# Patient Record
Sex: MALE | Race: WHITE | NOT HISPANIC OR LATINO | Employment: FULL TIME | URBAN - METROPOLITAN AREA
[De-identification: names, ages, dates, MRNs, and addresses within clinical notes are randomized per-mention and may not be internally consistent; named-entity substitution may affect disease eponyms.]

---

## 2018-01-24 ENCOUNTER — OFFICE VISIT (OUTPATIENT)
Dept: SLEEP MEDICINE | Facility: CLINIC | Age: 23
End: 2018-01-24
Payer: MEDICAID

## 2018-01-24 VITALS
WEIGHT: 196.19 LBS | HEIGHT: 69 IN | HEART RATE: 83 BPM | DIASTOLIC BLOOD PRESSURE: 71 MMHG | SYSTOLIC BLOOD PRESSURE: 109 MMHG | BODY MASS INDEX: 29.06 KG/M2

## 2018-01-24 DIAGNOSIS — G47.30 SLEEP APNEA, UNSPECIFIED TYPE: Primary | ICD-10-CM

## 2018-01-24 DIAGNOSIS — F51.01 PRIMARY INSOMNIA: ICD-10-CM

## 2018-01-24 PROCEDURE — 99213 OFFICE O/P EST LOW 20 MIN: CPT | Mod: PBBFAC | Performed by: PSYCHIATRY & NEUROLOGY

## 2018-01-24 PROCEDURE — 99204 OFFICE O/P NEW MOD 45 MIN: CPT | Mod: S$PBB,,, | Performed by: PSYCHIATRY & NEUROLOGY

## 2018-01-24 PROCEDURE — 99999 PR PBB SHADOW E&M-EST. PATIENT-LVL III: CPT | Mod: PBBFAC,,, | Performed by: PSYCHIATRY & NEUROLOGY

## 2018-01-24 RX ORDER — PREDNISONE 20 MG/1
20 TABLET ORAL DAILY
COMMUNITY
End: 2018-05-16

## 2018-01-24 RX ORDER — TIOTROPIUM BROMIDE 18 UG/1
18 CAPSULE ORAL; RESPIRATORY (INHALATION) DAILY
COMMUNITY
End: 2018-03-14

## 2018-01-24 RX ORDER — OSELTAMIVIR PHOSPHATE 75 MG/1
75 CAPSULE ORAL
COMMUNITY
End: 2018-03-14

## 2018-01-24 RX ORDER — ALBUTEROL SULFATE 1.25 MG/3ML
2.5 SOLUTION RESPIRATORY (INHALATION) EVERY 6 HOURS PRN
COMMUNITY
End: 2019-02-04

## 2018-01-24 RX ORDER — AZITHROMYCIN 250 MG/1
500 TABLET, FILM COATED ORAL DAILY
COMMUNITY
End: 2018-05-16

## 2018-01-24 RX ORDER — FLUTICASONE FUROATE AND VILANTEROL 200; 25 UG/1; UG/1
1 POWDER RESPIRATORY (INHALATION) DAILY
COMMUNITY
End: 2021-05-28

## 2018-01-24 RX ORDER — MONTELUKAST SODIUM 10 MG/1
10 TABLET ORAL NIGHTLY
COMMUNITY
End: 2021-05-28

## 2018-01-24 RX ORDER — FLUTICASONE PROPIONATE 50 MCG
4 SPRAY, SUSPENSION (ML) NASAL DAILY
COMMUNITY
End: 2019-02-04 | Stop reason: SDUPTHER

## 2018-01-24 RX ORDER — ALBUTEROL SULFATE 90 UG/1
2 AEROSOL, METERED RESPIRATORY (INHALATION) EVERY 6 HOURS PRN
COMMUNITY
End: 2019-02-04 | Stop reason: SDUPTHER

## 2018-01-24 RX ORDER — METHYLPHENIDATE HYDROCHLORIDE 36 MG/1
36 TABLET ORAL EVERY MORNING
COMMUNITY
End: 2019-01-16 | Stop reason: SDUPTHER

## 2018-01-24 RX ORDER — BENZONATATE 200 MG/1
200 CAPSULE ORAL 3 TIMES DAILY PRN
COMMUNITY
End: 2018-05-16

## 2018-01-24 RX ORDER — NAPROXEN SODIUM 220 MG
220 TABLET ORAL
COMMUNITY

## 2018-01-24 RX ORDER — TRAZODONE HYDROCHLORIDE 300 MG/1
300 TABLET ORAL NIGHTLY
COMMUNITY
End: 2018-05-02

## 2018-01-24 RX ORDER — CLONIDINE HYDROCHLORIDE 0.1 MG/1
0.1 TABLET ORAL 2 TIMES DAILY
COMMUNITY
End: 2018-05-02

## 2018-01-24 NOTE — LETTER
January 24, 2018      Mac Cooper MD  [address] [place] [address]           [facility]  [address] [address]  Phone: [phone]          Patient: Nghia Chahal   MR Number: [id number]   YOB: 1995   Date of Visit: 1/24/2018       Dear Dr. Mac Cooper:    Thank you for referring Nghia Chahal to me for evaluation. Attached you will find relevant portions of my assessment and plan of care.    If you have questions, please do not hesitate to call me. I look forward to following Nghia Chahal along with you.    Sincerely,    Luis Michaels MD    Enclosure  CC:  No Recipients    If you would like to receive this communication electronically, please contact [email or web address] or [phone] to request more information on [employer] Link access.    For providers and/or their staff who would like to refer a patient to [facility], please contact us through our one-stop-shop provider referral line, [facility] , at [phone].    If you feel you have received this communication in error or would no longer like to receive these types of communications, please e-mail [email or web address]

## 2018-01-24 NOTE — PATIENT INSTRUCTIONS
Lalita or Vijay will contact you to schedule your sleep study. Their number is [phone] (ext 2). The [facility] is located on 7th floor of the [facility].    We will call you when the sleep study results are ready - if you have not heard from us by 2 weeks from the date of the study, please call [phone] (ext 1).    You are advised to abstain from driving should you feel sleepy or drowsy.

## 2018-01-24 NOTE — PROGRESS NOTES
"This 22 y.o. male patient presents for the evaluation of possible obstructive sleep apnea.    Patient not sleeping well since age 13.  Has tried numerous medications, but not working too well. Variable response.  Can feel hung over the next day    Takes 0.1 mg clonidine at bedtime and 300 mg trazodone at bedtime  Tried melatonin in the past.    He reports difficulty initiating sleep at night.  Not sleepy at bedtime, like morning "just struck".  Actually doesn't feel sleepy until around 6 am, but then still lies awake    He tried eliminating electronics from bedroom, darkening shades    In past, tried Concerta 36 mg for ADHD (tired, memory fog, daytime concentration difficulties) still taking    ESS = 1    Snoring when sleeping  Restless in sleep, toss and turn    Denies restless legs feeling      SLEEP ROUTINE:   Bed partner: own room   Time to bed: varies 10-11 pm   Sleep onset latency: prolonged   Disruptions or awakenings: none   Wakeup time: 6:30 am (work) if not having to work, stays in bed until about 10 am   Perceived sleep quality: poor   Daytime naps: none    History reviewed. No pertinent past medical history.    History reviewed. No pertinent surgical history.    Family History   Problem Relation Age of Onset    Retinal detachment Neg Hx     Macular degeneration Neg Hx     Glaucoma Neg Hx        Social History   Substance Use Topics    Smoking status: Never Smoker    Smokeless tobacco: Not on file    Alcohol use No       ALLERGIES: Reviewed in EPIC    CURRENT MEDICATIONS: Reviewed in EPIC    REVIEW OF SYSTEMS:  Sleep related symptoms as per HPI;    Denies weight gain;    Sometimes sinus problems;    Denies dyspnea;    Denies palpitations;    Denies acid reflux;    Denies polyuria;    sometimes body aches   Denies headaches;    sometimes mood disturbance;    Denies anemia;    Otherwise, a balance of systems reviewed is negative.    PHYSICAL EXAM:  /71   Pulse 83   Ht 5' 9" (1.753 m)   Wt 89 " kg (196 lb 3.4 oz)   BMI 28.98 kg/m²   GENERAL: Well groomed; Normally developed;  HEENT: Conjunctivae are non-erythematous; Pupils equal, round, and reactive to light;    Nose is symmetrical; Nasal mucosa is pink and moist; Septum is midline;    Turbinates are normal; Nasal airflow is normal;    Posterior pharynx is pink; Posterior palate is very low; Modified Mallampati: IV   Uvula is normal; Tongue is normal; Tonsils +1;    Dentition is fair; No TMJ tenderness;    Jaw opening and protrusion without click and without discomfort.  NECK: Supple. No thyromegaly. No palpable nodes. Neck circumference in inches is 14.5  SKIN: On face and neck: No abrasions, no rashes, no lesions.     No subcutaneous nodules are palpable.  RESPIRATORY: Chest is clear to auscultation.     Normal chest expansion and non-labored breathing at rest.  CARDIOVASCULAR: Normal S1, S2.  No murmurs, gallops or rubs.   No carotid bruits bilaterally.  EXTREMITIES: No clubbing. No cyanosis. Edema is absent.   NEURO: Oriented to time, place and person.    Normal attention span and concentration.  Station normal. Gait normal.  PSYCH: Affect is full. Mood is normal.    ASSESSMENT:    1. Sleep Apnea, unspecified. The patient symptomatically has snoring, poor sleep quality, and excessive daytime sleepiness (masked by Concerta) with exam findings of a crowded oral airway. This warrants further investigation for possible obstructive sleep apnea.    2. Insomnia NEC. He has tried multiple medications, including melatonin. Difficulty initiating and sustaining sleep, which could be related to underlying sleep apnea.         He states his goal is to sleep without medications.    PLAN:    Diagnostic: Overnight polysomnogram, in-lab. The nature of this procedure and its indication was discussed with the patient.    Precautions: The patient was advised to abstain from driving should they feel sleepy or drowsy.    Follow up: I will see the patient back after the  sleep study has been completed. At this time, we can review the data and discuss potential treatment options. MD/NP

## 2018-02-09 ENCOUNTER — TELEPHONE (OUTPATIENT)
Dept: SLEEP MEDICINE | Facility: CLINIC | Age: 23
End: 2018-02-09

## 2018-02-09 DIAGNOSIS — G47.30 SLEEP APNEA, UNSPECIFIED TYPE: Primary | ICD-10-CM

## 2018-02-09 NOTE — TELEPHONE ENCOUNTER
"Returned call and informed patient mother "for questions regarding this insurance denial, send an In Basket message to the Pre Service Intake pool or call the [facility] Pre-Service department at [phone] and reference referral ID [id number].The Pre-Service department hours are M-F 8 a.m. to 5 p.m"  (Autho department for more details to why patient was denied)    Also informed patient that provider ordered HST once approve by insurance pt will be contacted to schedule.    "

## 2018-02-09 NOTE — TELEPHONE ENCOUNTER
----- Message from Mmee Crowder sent at 2/8/2018 11:52 AM CST -----  Contact: KVNG SPRINGER [[id number]]  x_  1st Request  _  2nd Request  _  3rd Request      Who: KVNG SPRINGER [[id number]]    Why: Patient would like to know why he was denied sleep study. Patient would like for nurse to discuss it with mother.  Please call     What Number to Call Back: [phone] Jason Springer (Mother)    When to Expect a call back: (Within 24 hours)    Please return the call at earliest convenience. Thanks!

## 2018-02-09 NOTE — TELEPHONE ENCOUNTER
Please inform patient:    In-lab sleep study was denied by his insurance.    Will proceed with home sleep study, which will be scheduled by Vijay in the sleep lab.    Sincerely,  Dr. Michaels

## 2018-02-15 ENCOUNTER — TELEPHONE (OUTPATIENT)
Dept: SLEEP MEDICINE | Facility: OTHER | Age: 23
End: 2018-02-15

## 2018-02-20 ENCOUNTER — TELEPHONE (OUTPATIENT)
Dept: SLEEP MEDICINE | Facility: OTHER | Age: 23
End: 2018-02-20

## 2018-02-21 ENCOUNTER — HOSPITAL ENCOUNTER (OUTPATIENT)
Dept: SLEEP MEDICINE | Facility: OTHER | Age: 23
Discharge: HOME | End: 2018-02-21
Attending: PSYCHIATRY & NEUROLOGY
Payer: MEDICAID

## 2018-02-21 DIAGNOSIS — G47.33 OSA (OBSTRUCTIVE SLEEP APNEA): ICD-10-CM

## 2018-02-21 DIAGNOSIS — G47.30 SLEEP APNEA, UNSPECIFIED TYPE: ICD-10-CM

## 2018-02-21 PROCEDURE — 95800 SLP STDY UNATTENDED: CPT

## 2018-02-21 PROCEDURE — 95800 SLP STDY UNATTENDED: CPT | Mod: 26,,, | Performed by: PSYCHIATRY & NEUROLOGY

## 2018-02-28 ENCOUNTER — TELEPHONE (OUTPATIENT)
Dept: SLEEP MEDICINE | Facility: OTHER | Age: 23
End: 2018-02-28

## 2018-03-14 ENCOUNTER — OFFICE VISIT (OUTPATIENT)
Dept: SLEEP MEDICINE | Facility: CLINIC | Age: 23
End: 2018-03-14
Payer: MEDICAID

## 2018-03-14 VITALS
SYSTOLIC BLOOD PRESSURE: 119 MMHG | HEIGHT: 69 IN | WEIGHT: 183 LBS | DIASTOLIC BLOOD PRESSURE: 76 MMHG | HEART RATE: 84 BPM | BODY MASS INDEX: 27.11 KG/M2

## 2018-03-14 DIAGNOSIS — G47.33 OSA (OBSTRUCTIVE SLEEP APNEA): Primary | ICD-10-CM

## 2018-03-14 PROCEDURE — 99999 PR PBB SHADOW E&M-EST. PATIENT-LVL III: CPT | Mod: PBBFAC,,, | Performed by: NURSE PRACTITIONER

## 2018-03-14 PROCEDURE — 99213 OFFICE O/P EST LOW 20 MIN: CPT | Mod: PBBFAC | Performed by: NURSE PRACTITIONER

## 2018-03-14 PROCEDURE — 99214 OFFICE O/P EST MOD 30 MIN: CPT | Mod: S$PBB,,, | Performed by: NURSE PRACTITIONER

## 2018-03-14 RX ORDER — HYDROCODONE BITARTRATE AND ACETAMINOPHEN 5; 325 MG/1; MG/1
TABLET ORAL
COMMUNITY
Start: 2018-02-23 | End: 2018-05-16

## 2018-03-14 RX ORDER — IBUPROFEN 800 MG/1
TABLET ORAL
COMMUNITY
Start: 2018-02-23 | End: 2019-02-04

## 2018-03-14 NOTE — PATIENT INSTRUCTIONS
Your prescription for CPAP has been sent through our system. You should receive a call from [facility] in the next few days regarding your CPAP set up.     For any questions regarding your machine or supplies, please contact [facility] at [phone] (Ext [phone]).

## 2018-03-26 ENCOUNTER — TELEPHONE (OUTPATIENT)
Dept: SLEEP MEDICINE | Facility: CLINIC | Age: 23
End: 2018-03-26

## 2018-03-26 DIAGNOSIS — G47.33 OBSTRUCTIVE SLEEP APNEA: Primary | ICD-10-CM

## 2018-03-26 NOTE — TELEPHONE ENCOUNTER
----- Message from Radha Piña, RRT sent at 3/23/2018 11:16 AM CDT -----  Regarding: RX  Patient would like to get a rx for FFM and heated tubing.

## 2018-05-02 ENCOUNTER — OFFICE VISIT (OUTPATIENT)
Dept: SLEEP MEDICINE | Facility: CLINIC | Age: 23
End: 2018-05-02
Payer: MEDICAID

## 2018-05-02 VITALS
SYSTOLIC BLOOD PRESSURE: 110 MMHG | WEIGHT: 187.19 LBS | DIASTOLIC BLOOD PRESSURE: 70 MMHG | HEIGHT: 69 IN | HEART RATE: 89 BPM | BODY MASS INDEX: 27.73 KG/M2

## 2018-05-02 DIAGNOSIS — R09.81 CHRONIC NASAL CONGESTION: ICD-10-CM

## 2018-05-02 DIAGNOSIS — G47.10 PERSISTENT HYPERSOMNIA: ICD-10-CM

## 2018-05-02 DIAGNOSIS — G47.33 OSA (OBSTRUCTIVE SLEEP APNEA): Primary | ICD-10-CM

## 2018-05-02 PROCEDURE — 99999 PR PBB SHADOW E&M-EST. PATIENT-LVL V: CPT | Mod: PBBFAC,,, | Performed by: NURSE PRACTITIONER

## 2018-05-02 PROCEDURE — 99215 OFFICE O/P EST HI 40 MIN: CPT | Mod: PBBFAC | Performed by: NURSE PRACTITIONER

## 2018-05-02 PROCEDURE — 99214 OFFICE O/P EST MOD 30 MIN: CPT | Mod: S$PBB,,, | Performed by: NURSE PRACTITIONER

## 2018-05-02 RX ORDER — TRAZODONE HYDROCHLORIDE 100 MG/1
TABLET ORAL
COMMUNITY
Start: 2018-04-26 | End: 2018-12-05

## 2018-05-02 RX ORDER — TRAMADOL HYDROCHLORIDE 50 MG/1
TABLET ORAL
COMMUNITY
Start: 2018-03-21 | End: 2018-05-16

## 2018-05-02 RX ORDER — AZELASTINE HCL 205.5 UG/1
4 SPRAY NASAL 2 TIMES DAILY
COMMUNITY
Start: 2018-04-17 | End: 2019-02-04 | Stop reason: SDUPTHER

## 2018-05-02 RX ORDER — DICLOFENAC SODIUM 75 MG/1
TABLET, DELAYED RELEASE ORAL
COMMUNITY
Start: 2018-03-21 | End: 2021-05-28

## 2018-05-02 RX ORDER — ALBUTEROL SULFATE 0.83 MG/ML
SOLUTION RESPIRATORY (INHALATION)
COMMUNITY
Start: 2018-04-02 | End: 2019-02-04 | Stop reason: SDUPTHER

## 2018-05-02 RX ORDER — METHOCARBAMOL 500 MG/1
500 TABLET, FILM COATED ORAL
COMMUNITY
Start: 2018-04-25 | End: 2021-05-28

## 2018-05-02 NOTE — PROGRESS NOTES
"Nghia Chahal seen in follow-up for JAYLEN management and CPAP equipment check after set up.     INTERVAL HISTORY:    05/02/2018 HESHAM Javier NP: Pt returns after set up of PAP machine on 03/23/2018 at [facility]. Pt sleep complaints of snoring, and poor sleep quality now resolved with PAP use. Reports that nocturia of up to 6 x per night resolved, "I don't wake up anymore". Also has weaned off Trazodone 300 mg, now only taking 50 - 100 mg dosing. He has also stopped taking clonidine. Goal is to stop taking sleep meds altogether. However despite averaging nearly 8 hours of sleep with CPAP nightly and taking Concerta 36 mg daily, he continues to have excessive daytime sleepiness. Reports that he finds himself dozing off while driving and he struggles to stay awake while at work. He finds daytime sleepiness disabling.      Denies oral/nasal drying with Airfit F20 mask. Initially got size small mask which caused skin irritation; pt's mother had an extra mask size large which he uses that fits better. Denies mask leaks. Denies rainout.  Denies pressure intolerance or air hunger. Denies aerophagia. Pt's machine originally prescribed 4 - 20 cm but he had air hunger and self-adjusted machine settings to 12 - 20 cm.     Since starting CPAP, chronic nasal congestion has worsened despite taking Flonase, Astelin, and Singulair. Pt also using heated tubing with CPAP.     EPWORTH SLEEPINESS SCALE 3/13/2018   Sitting and reading 0   Watching TV 0   Sitting, inactive in a public place (e.g. a theatre or a meeting)s 1   As a passenger in a car for an hour without a break 3   Lying down to rest in the afternoon when circumstances permit 0   Sitting and talking to someone 0   Sitting quietly after a lunch without alcohol 0   In a car, while stopped for a few minutes in traffic 3   Total score 7       CPAP Interrogation: APAP 12-20 cm  Compliance Summary Days with Device Usage: 30 days Percentage of Days >=4 Hours: 100.0% Average Usage (Days " "Used): 7 hrs. 57 mins. 52 secs. Average Usage (All Days): 7 hrs. 57 mins. 52 secs  Apnea Indices Average AHI: 1.3 Average OA Index: 0.4 Average CA Index: 0.2   Large Leak Average Time in Large Leak: 3 mins. 10 secs. Average % of Night in Large Leak: 0.7%   Periodic Breathing Average % of Night in PB: 0.2%  90%tile pressure: 15.8 cm      03/14/2018 HESHAM Javier NP: Of note, in -lab PSG denied by insurance; HST ordered instead. Discussed 02/21/2018 home sleep study results in detail. Pt complaints of snoring, poor sleep quality, and excessive daytime sleepiness remain the same.     Continues to take Concentra 36 mg daily for ADHD and Trazodone 300 mg w/ Clonidine 0.1 mg for insomnia. Both managed by PCP.     EPWORTH SLEEPINESS SCALE 3/13/2018   Sitting and reading 0   Watching TV 0   Sitting, inactive in a public place (e.g. a theatre or a meeting) 1   As a passenger in a car for an hour without a break 3   Lying down to rest in the afternoon when circumstances permit 0   Sitting and talking to someone 0   Sitting quietly after a lunch without alcohol 0   In a car, while stopped for a few minutes in traffic 3   Total score 7          01/24/2018 Dr. Michaels This 22 y.o. male patient presents for the evaluation of possible obstructive sleep apnea.    Patient not sleeping well since age 13.  Has tried numerous medications, but not working too well. Variable response.  Can feel hung over the next day    Takes 0.1 mg clonidine at bedtime and 300 mg trazodone at bedtime  Tried melatonin in the past.    He reports difficulty initiating sleep at night.  Not sleepy at bedtime, like morning "just struck".  Actually doesn't feel sleepy until around 6 am, but then still lies awake    He tried eliminating electronics from bedroom, darkening shades    In past, tried Concerta 36 mg for ADHD (tired, memory fog, daytime concentration difficulties) still taking    ESS = 1    Snoring when sleeping  Restless in sleep, toss and turn    Denies " "restless legs feeling      SLEEP ROUTINE:   Bed partner: own room   Time to bed: varies 10-11 pm   Sleep onset latency: prolonged   Disruptions or awakenings: none   Wakeup time: 6:30 am (work) if not having to work, stays in bed until about 10 am   Perceived sleep quality: poor   Daytime naps: none    Baseline Sleep Study: 02/21/2018  lb. The overall AHI was 14 and overall RDI was 41. The oxygen yas was 87.5 % and % time < 90% SpO2 was 0.1 %.      No past medical history on file.    No past surgical history on file.    Family History   Problem Relation Age of Onset    Sleep apnea Mother     Retinal detachment Neg Hx     Macular degeneration Neg Hx     Glaucoma Neg Hx        Social History   Substance Use Topics    Smoking status: Never Smoker    Smokeless tobacco: Not on file    Alcohol use No       ALLERGIES: Reviewed in EPIC    CURRENT MEDICATIONS: Reviewed in EPIC    REVIEW OF SYSTEMS:  Sleep related symptoms as per HPI;    Denies weight gain;    Sometimes sinus problems;    Denies dyspnea;    Denies palpitations;    Denies acid reflux;    Denies polyuria;    sometimes body aches   Denies headaches;    sometimes mood disturbance;    Denies anemia;    Otherwise, a balance of systems reviewed is negative.    PHYSICAL EXAM:  /70   Pulse 89   Ht 5' 9" (1.753 m)   Wt 84.9 kg (187 lb 2.7 oz)   BMI 27.64 kg/m²   GENERAL: Well groomed; Normally developed;  HEENT: Conjunctivae are non-erythematous; Pupils equal, round, and reactive to light;    Nose is symmetrical; Nasal mucosa is pink and moist; Septum is midline;    Turbinates are normal; Nasal airflow is normal;    Posterior pharynx is pink; Posterior palate is very low; Modified Mallampati: IV   Uvula is normal; Tongue is normal; Tonsils +1;    Dentition is fair; No TMJ tenderness;    Jaw opening and protrusion without click and without discomfort.  NECK: Supple. No thyromegaly. No palpable nodes. Neck circumference in inches is 14.5  SKIN: " On face and neck: No abrasions, no rashes, no lesions.     No subcutaneous nodules are palpable.  RESPIRATORY: Chest is clear to auscultation.     Normal chest expansion and non-labored breathing at rest.  CARDIOVASCULAR: Normal S1, S2.  No murmurs, gallops or rubs.   No carotid bruits bilaterally.  EXTREMITIES: No clubbing. No cyanosis. Edema is absent.   NEURO: Oriented to time, place and person.    Normal attention span and concentration.  Station normal. Gait normal.  PSYCH: Affect is full. Mood is normal.    ASSESSMENT:    Obstructive sleep apnea, mild by AHI, severe by RDI. The patient symptomatically has snoring, and poor sleep quality resolved with CPAP use. The patient is adherent on CPAP and experiencing symptomatic benefit. Medical co-morbidities: overweight BMI.     Persistent hypersomnia, despite JAYLEN being effectively treated and daily Concerta 36 mg. Pt not interested in additional medications ie stimulants unless it is warranted and condition is test-proven     Insomnia NEC. Improving since JAYLEN treatment, continues to wean Trazodone, now down to 50 - 100 mg qhs.       He states his goal is to sleep without medications.    Chronic nasal congestion, exacerbated by PAP therapy despite Flonase, Astelin, heated tubing, and Singulair    PLAN:    Treatment:  -continue auto CPAP 12 - 20 cm  -PSG (w/ APAP) followed by MSLT to rule out narcolepsy; consider stopping stimulants one week before test  -Referral to ENT for nasal congestion eval/management   -RTC after MSLT, consider f/u MD if + narcolepsy for management    Education: During our discussion today, we talked about the etiology of obstructive sleep apnea as well as the potential ramifications of untreated sleep apnea, which could include daytime sleepiness, hypertension, heart disease and/or stroke. We discussed potential treatment options, which could include weight loss, body positioning, continuous positive airway pressure (CPAP), OA, EPAP, or  referral for surgical consideration.    Precautions: The patient was advised to abstain from driving should they feel sleepy  or drowsy.

## 2018-05-16 ENCOUNTER — OFFICE VISIT (OUTPATIENT)
Dept: OTOLARYNGOLOGY | Facility: CLINIC | Age: 23
End: 2018-05-16
Payer: MEDICAID

## 2018-05-16 VITALS — BODY MASS INDEX: 27.88 KG/M2 | HEIGHT: 69 IN | WEIGHT: 188.25 LBS

## 2018-05-16 DIAGNOSIS — R09.81 NASAL CONGESTION: ICD-10-CM

## 2018-05-16 DIAGNOSIS — J32.4 CHRONIC PANSINUSITIS: ICD-10-CM

## 2018-05-16 DIAGNOSIS — G47.33 OSA (OBSTRUCTIVE SLEEP APNEA): Primary | ICD-10-CM

## 2018-05-16 DIAGNOSIS — J34.3 NASAL TURBINATE HYPERTROPHY: ICD-10-CM

## 2018-05-16 DIAGNOSIS — J34.2 NASAL SEPTAL DEVIATION: ICD-10-CM

## 2018-05-16 PROCEDURE — 99999 PR PBB SHADOW E&M-EST. PATIENT-LVL III: CPT | Mod: PBBFAC,,, | Performed by: OTOLARYNGOLOGY

## 2018-05-16 PROCEDURE — 99213 OFFICE O/P EST LOW 20 MIN: CPT | Mod: PBBFAC,PO,25 | Performed by: OTOLARYNGOLOGY

## 2018-05-16 PROCEDURE — 31231 NASAL ENDOSCOPY DX: CPT | Mod: S$PBB,,, | Performed by: OTOLARYNGOLOGY

## 2018-05-16 PROCEDURE — 31231 NASAL ENDOSCOPY DX: CPT | Mod: PBBFAC,PO | Performed by: OTOLARYNGOLOGY

## 2018-05-16 PROCEDURE — 99204 OFFICE O/P NEW MOD 45 MIN: CPT | Mod: 25,S$PBB,, | Performed by: OTOLARYNGOLOGY

## 2018-05-16 NOTE — LETTER
May 16, 2018      Mikhail Javier, NP  [address]  [address]  [facility] [address]           [facility]  [address] [facility] [address] [place] [address]  Phone: [phone]  Fax: [phone]          Patient: Nghia Chahal   MR Number: [id number]   YOB: 1995   Date of Visit: 5/16/2018       Dear Mikhail Javier:    Thank you for referring Nghia Chahal to me for evaluation. Attached you will find relevant portions of my assessment and plan of care.    If you have questions, please do not hesitate to call me. I look forward to following Nghia Chahal along with you.    Sincerely,    Jhon Hayes MD    Enclosure  CC:  No Recipients    If you would like to receive this communication electronically, please contact [email or web address] or [phone] to request more information on [employer] Link access.    For providers and/or their staff who would like to refer a patient to [facility], please contact us through our one-stop-shop provider referral line, [facility], at [phone].    If you feel you have received this communication in error or would no longer like to receive these types of communications, please e-mail [email or web address]

## 2018-05-16 NOTE — PROGRESS NOTES
Subjective:       Patient ID: Nghia Chahal is a 22 y.o. male.    Chief Complaint: Sinus Problem and Nasal Congestion    Nghia is here for nasal congestion. Symptoms have been present for years, but worse recently. He has JAYLEN and is compliant with therapy, he has used Flonase Astelin and saline without improvement. Uses singulair but not an oral AH. He is using humidification. Congestion is worse at night. Rhinorrhea is worse during the daytime. He reports recurrent sinus infections, reports at least 6 infections. Last 2 weeks with post-infectious cough for 1.5 months.    Also has concern for other sleep disorders being worked up.  Has asthma and AR - dust mites on RAST testing done 1 yr ago at [facility]. Never had IT. He reports changing his bedcovers and doing home modifications.      AHI prior to treatment was 14, with yas of 87%.   BMI 27  Has tailbone pain and fibromylagia as well as depression    SNOT-22 = 57  No previous head or neck surgery    History   Smoking Status    Never Smoker   Smokeless Tobacco    Not on file     History   Alcohol Use No          Review of Systems   Constitutional: Negative for activity change and appetite change.   Eyes: Negative for discharge.   Respiratory: Negative for difficulty breathing and wheezing   Cardiovascular: Negative for chest pain.   Gastrointestinal: Negative for abdominal distention and abdominal pain.   Endocrine: Negative for cold intolerance and heat intolerance.   Genitourinary: Negative for dysuria.   Musculoskeletal: Negative for gait problem and joint swelling.   Skin: Negative for color change and pallor.   Neurological: Negative for syncope and weakness.   Psychiatric/Behavioral: Negative for agitation and confusion.     Objective:        Constitutional:   He is oriented to person, place, and time. He appears well-developed and well-nourished. He appears alert. He is active. Normal speech.      Head:  Normocephalic and atraumatic. Head is without  TMJ tenderness. No scars. Salivary glands normal.  Facial strength is normal.      Ears:    Right Ear: No drainage or swelling. No middle ear effusion.   Left Ear: No drainage or swelling.  No middle ear effusion.     Nose:  Septal deviation (moderate left) present. No mucosal edema, rhinorrhea or sinus tenderness. Turbinate hypertrophy (moderate).      Mouth/Throat  Oropharynx clear and moist without lesions or asymmetry, normal uvula midline and mirror exam normal. Normal dentition. No uvula swelling, lacerations or trismus. No oropharyngeal exudate. Tonsillar erythema, tonsillar exudate.    Normal pharynx, normal palate length. Normal tongue size. No significant retrognathia. Tonsils small      Neck:  Full range of motion with neck supple and no adenopathy. Thyroid tenderness is present. No tracheal deviation, no edema, no erythema, normal range of motion, no stridor, no crepitus and no neck rigidity present. No thyroid mass present.     Cardiovascular:   Intact distal pulses and normal pulses.      Pulmonary/Chest:   Effort normal and breath sounds normal. No stridor.     Psychiatric:   His speech is normal and behavior is normal. His mood appears not anxious. His affect is not labile.     Neurological:   He is alert and oriented to person, place, and time. No sensory deficit.     Skin:   No abrasions, lacerations, lesions, or rashes. No abrasion and no bruising noted.         Tests / Results:  Reviewed Sleep notes    Name: Nghia Chahal     Pre-procedure diagnosis: JAYLEN (obstructive sleep apnea) [G47.33]  Post-procedure diagnosis: Same    Procedure: Bilateral nasal endoscopy  Anesthesia:  2% Lidocaine and 4% Oxymetazoline topical    Procedure: Risks, benefits, and alternatives of the procedure were discussed with the patient, and consent was obtained to perform a nasal endoscopy.  The nasal cavity was sprayed with a topical decongestant and topical anesthetic. After adequate anesthesia was obtained, the scope  was passed into each nostril independently.  The nasal cavities, nasopharynx, choana, eustachian tube, fossa of Rosenmüller, and adenoids were examined with the findings described below. At the end of the examination, the scope was removed. The patient tolerated the procedure well with no complications.     Findings:  -     Nasal septum: LEFT moderate deviation along entire length  -     Inferior turbinate: hypertrophy or edema (Moderate) bilaterally  -     Middle meatus: LEFT: no purulence, no obstruction, no polyps  RIGHT: no purulence, no obstruction, no polyps  -     Sphenoethmoidal recess LEFT: no purulence, no obstruction, no polyps RIGHT no purulence, no obstruction, no polyps  -     Adenoids have no hypertrophy  -     There is no stenosis of the choana,  eustachian tube, or nasopharynx  -     Fossa of Rosenmüller is symmetric and contains no mass  -     No other findings      Assessment:       1. JAYLEN (obstructive sleep apnea)    2. Nasal congestion    3. Chronic pansinusitis    4. Nasal turbinate hypertrophy    5. Nasal septal deviation          Plan:       CT Sinus  Continue nasal medications that he is currently doing  May need to consider Allergy testing  He is wanting to look at least invasive methods for now  FU with imaging to discuss options.

## 2018-06-06 ENCOUNTER — HOSPITAL ENCOUNTER (OUTPATIENT)
Dept: RADIOLOGY | Facility: HOSPITAL | Age: 23
Discharge: HOME | End: 2018-06-06
Attending: OTOLARYNGOLOGY
Payer: MEDICAID

## 2018-06-06 ENCOUNTER — OFFICE VISIT (OUTPATIENT)
Dept: OTOLARYNGOLOGY | Facility: CLINIC | Age: 23
End: 2018-06-06
Payer: MEDICAID

## 2018-06-06 VITALS — HEIGHT: 69 IN | BODY MASS INDEX: 28.05 KG/M2 | WEIGHT: 189.38 LBS

## 2018-06-06 DIAGNOSIS — J32.4 CHRONIC PANSINUSITIS: ICD-10-CM

## 2018-06-06 DIAGNOSIS — G47.33 OSA (OBSTRUCTIVE SLEEP APNEA): ICD-10-CM

## 2018-06-06 DIAGNOSIS — R09.81 NASAL CONGESTION: ICD-10-CM

## 2018-06-06 DIAGNOSIS — R09.81 NASAL CONGESTION: Primary | ICD-10-CM

## 2018-06-06 DIAGNOSIS — J30.89 NON-SEASONAL ALLERGIC RHINITIS, UNSPECIFIED TRIGGER: ICD-10-CM

## 2018-06-06 DIAGNOSIS — J34.3 NASAL TURBINATE HYPERTROPHY: ICD-10-CM

## 2018-06-06 DIAGNOSIS — J34.2 NASAL SEPTAL DEVIATION: ICD-10-CM

## 2018-06-06 PROCEDURE — 99999 PR PBB SHADOW E&M-EST. PATIENT-LVL III: CPT | Mod: PBBFAC,,, | Performed by: OTOLARYNGOLOGY

## 2018-06-06 PROCEDURE — 99213 OFFICE O/P EST LOW 20 MIN: CPT | Mod: S$PBB,,, | Performed by: OTOLARYNGOLOGY

## 2018-06-06 PROCEDURE — 70486 CT MAXILLOFACIAL W/O DYE: CPT | Mod: TC,PO

## 2018-06-06 PROCEDURE — 99213 OFFICE O/P EST LOW 20 MIN: CPT | Mod: PBBFAC,25,PO | Performed by: OTOLARYNGOLOGY

## 2018-06-06 PROCEDURE — 70486 CT MAXILLOFACIAL W/O DYE: CPT | Mod: 26,,, | Performed by: RADIOLOGY

## 2018-06-06 NOTE — PROGRESS NOTES
Subjective:       Patient ID: Nghia Chahal is a 22 y.o. male.    Chief Complaint: review ct    Nghia is here for follow-up of nasal obstruction. Here for review. No changes. Still with nasal issues. Feels medicines have not helped.     Review of Systems   Constitutional: Negative for activity change and appetite change.   Respiratory: Negative for difficulty breathing and wheezing   Cardiovascular: Negative for chest pain.      Objective:        Constitutional:   Vital signs are normal. He appears well-developed and well-nourished.     Head:  Normocephalic and atraumatic.     Nose:  Septal deviation present. Turbinate hypertrophy (mod).          Tests / Results:  Cranks Coolidge CT Score:  I personally reviewed the CT Sinus and my findings indicate:  Normal sinus scan    Sinus system Right Left   Maxillary 0 0   Anterior ethmoid 0 0   Posterior ethmoid 0 0   Sphenoid 0 0   Frontal 0 0   OMC 0 0   Total score 0 0   0 = no opacification; 1= partial opacification; 2 = complete opacification    Staging for Rhinosinusitis. Otol Head Neck Surg 1997; 117:S35-S40      Assessment:       1. Nasal congestion    2. Nasal turbinate hypertrophy    3. Nasal septal deviation    4. Non-seasonal allergic rhinitis, unspecified trigger          Plan:         Ct Sinus negative for paranasal sinus disease. Not sure if allergy playing a role, but discussed non-surgical treatment for now as he is not interested in nasal airway (turbinates) surgery. Allergy referral to see if any triggers. FU with me prn.

## 2018-09-24 ENCOUNTER — HOSPITAL ENCOUNTER (OUTPATIENT)
Dept: SLEEP MEDICINE | Facility: OTHER | Age: 23
Discharge: HOME | End: 2018-09-24
Attending: FAMILY MEDICINE
Payer: MEDICAID

## 2018-09-24 DIAGNOSIS — G47.10 PERSISTENT HYPERSOMNIA: ICD-10-CM

## 2018-09-24 DIAGNOSIS — G47.19 EXCESSIVE DAYTIME SLEEPINESS: Primary | ICD-10-CM

## 2018-09-24 DIAGNOSIS — G47.33 OSA (OBSTRUCTIVE SLEEP APNEA): ICD-10-CM

## 2018-09-24 PROCEDURE — 95810 POLYSOM 6/> YRS 4/> PARAM: CPT | Mod: 26,,, | Performed by: INTERNAL MEDICINE

## 2018-09-24 PROCEDURE — 95805 MULTIPLE SLEEP LATENCY TEST: CPT

## 2018-09-24 PROCEDURE — 95805 MULTIPLE SLEEP LATENCY TEST: CPT | Mod: 26,,, | Performed by: INTERNAL MEDICINE

## 2018-09-24 PROCEDURE — 95810 POLYSOM 6/> YRS 4/> PARAM: CPT

## 2018-09-25 LAB
AMPHET+METHAMPHET UR QL: NORMAL
BARBITURATES UR QL SCN>200 NG/ML: NEGATIVE
BENZODIAZ UR QL SCN>200 NG/ML: NEGATIVE
BZE UR QL SCN: NEGATIVE
CANNABINOIDS UR QL SCN: NEGATIVE
CREAT UR-MCNC: 132.8 MG/DL
ETHANOL UR-MCNC: <10 MG/DL
METHADONE UR QL SCN>300 NG/ML: NEGATIVE
OPIATES UR QL SCN: NEGATIVE
PCP UR QL SCN>25 NG/ML: NEGATIVE
TOXICOLOGY INFORMATION: NORMAL

## 2018-09-25 PROCEDURE — 80307 DRUG TEST PRSMV CHEM ANLYZR: CPT

## 2018-09-25 NOTE — PROGRESS NOTES
Patient was educated about the purpose of the wires and what data was being collected. Patient was informed that the technician may need to enter the room during the shift to fix leads or make adjustments to the equipment. Patient started the study on cpap. Cpap pressure started at 14 cmH20 for patient comfort.    EKG appears to be NSR    Medium Simplus full face mask in use. Humdification3/Cflex3    Chin leads and electrodes changed for poor signal. EKG electrodes changed for poor signal    Optimal pressure 14 cmH20   Supine rem observed at optimal    Patient stated that he slept well with cpap    Follow up instructions were provided to the patient

## 2018-10-02 ENCOUNTER — TELEPHONE (OUTPATIENT)
Dept: SLEEP MEDICINE | Facility: CLINIC | Age: 23
End: 2018-10-02

## 2018-10-03 ENCOUNTER — TELEPHONE (OUTPATIENT)
Dept: SLEEP MEDICINE | Facility: CLINIC | Age: 23
End: 2018-10-03

## 2018-10-03 NOTE — TELEPHONE ENCOUNTER
----- Message from Jazlyn Perales sent at 10/3/2018  8:24 AM CDT -----  Contact: pt            Name of Who is Calling: KVNG SPRINGER [[id number]]      What is the request in detail: pt calling to see if he can be seen sooner due to insurance changing.. Please advise    Can the clinic reply by [facility]: no    What Number to Call Back [facility]: [phone]

## 2018-10-03 NOTE — TELEPHONE ENCOUNTER
Ramona,  I scheduled this gentleman yesterday for 11/7/18. He has medicaid. Now he wants to come before November 7 because of insurance changes. Do you want me to reschedule him?

## 2018-10-15 ENCOUNTER — TELEPHONE (OUTPATIENT)
Dept: SLEEP MEDICINE | Facility: CLINIC | Age: 23
End: 2018-10-15

## 2018-10-15 NOTE — TELEPHONE ENCOUNTER
For reasons unknown to me this message was addressed to me, but not routed. I simply noticed when completing chart review for another reason:  Deuce Caban MA          10/3/18 8:29 AM   Solange Greene,  I scheduled this gentleman yesterday for 11/7/18. He has medicaid. Now he wants to come before November 7 because of insurance changes. Do you want me to reschedule him?              If pt would like to be seen before currently scheduled appointment, if there is a spot available that accommodates his insurance and availability, even if with different provider, pt may reschedule.

## 2018-10-26 ENCOUNTER — OFFICE VISIT (OUTPATIENT)
Dept: URGENT CARE | Facility: CLINIC | Age: 23
End: 2018-10-26
Payer: MEDICAID

## 2018-10-26 VITALS
DIASTOLIC BLOOD PRESSURE: 77 MMHG | HEIGHT: 69 IN | OXYGEN SATURATION: 98 % | TEMPERATURE: 97 F | BODY MASS INDEX: 32.29 KG/M2 | WEIGHT: 218 LBS | HEART RATE: 90 BPM | SYSTOLIC BLOOD PRESSURE: 125 MMHG

## 2018-10-26 DIAGNOSIS — J40 BRONCHITIS: ICD-10-CM

## 2018-10-26 DIAGNOSIS — R52 GENERALIZED BODY ACHES: Primary | ICD-10-CM

## 2018-10-26 DIAGNOSIS — R05.9 COUGH: ICD-10-CM

## 2018-10-26 PROCEDURE — 87804 INFLUENZA ASSAY W/OPTIC: CPT | Mod: QW,S$GLB,, | Performed by: NURSE PRACTITIONER

## 2018-10-26 PROCEDURE — 99203 OFFICE O/P NEW LOW 30 MIN: CPT | Mod: S$GLB,,, | Performed by: EMERGENCY MEDICINE

## 2018-10-26 RX ORDER — BENZONATATE 100 MG/1
100 CAPSULE ORAL 3 TIMES DAILY PRN
Qty: 30 CAPSULE | Refills: 1 | Status: SHIPPED | OUTPATIENT
Start: 2018-10-26 | End: 2019-10-26

## 2018-10-26 RX ORDER — PREDNISONE 20 MG/1
40 TABLET ORAL DAILY
Qty: 10 TABLET | Refills: 0 | Status: SHIPPED | OUTPATIENT
Start: 2018-10-26 | End: 2018-10-31

## 2018-10-26 RX ORDER — AZITHROMYCIN 250 MG/1
TABLET, FILM COATED ORAL
Qty: 6 TABLET | Refills: 0 | Status: SHIPPED | OUTPATIENT
Start: 2018-10-26 | End: 2019-01-16

## 2018-10-26 NOTE — PROGRESS NOTES
"Subjective:       Patient ID: Nghia Chahal is a 23 y.o. male.    Vitals:  height is 5' 9" (1.753 m) and weight is 98.9 kg (218 lb). His oral temperature is 97.4 °F (36.3 °C). His blood pressure is 125/77 and his pulse is 90. His oxygen saturation is 98%.     Chief Complaint: Cough    Pt presents with nonproductive cough, body aches, chills and nasal congestion x 3 days. He denies fevers but reports chills. He is requesting flu swab.       Cough   This is a new problem. The current episode started in the past 7 days. Associated symptoms include chills and a sore throat. Pertinent negatives include no chest pain, ear pain, eye redness, fever, headaches, myalgias, shortness of breath or wheezing. Associated symptoms comments: Sneezing, running nose. Treatments tried: prednisone, tessalom perles. The treatment provided no relief.     Review of Systems   Constitution: Positive for chills and malaise/fatigue. Negative for fever.   HENT: Positive for sore throat. Negative for congestion, ear pain and hoarse voice.    Eyes: Negative for discharge and redness.   Cardiovascular: Negative for chest pain, dyspnea on exertion and leg swelling.   Respiratory: Positive for cough. Negative for shortness of breath, sputum production and wheezing.    Musculoskeletal: Negative for myalgias.   Gastrointestinal: Negative for abdominal pain and nausea.   Neurological: Negative for headaches.   All other systems reviewed and are negative.      Objective:      Physical Exam   Constitutional: He is oriented to person, place, and time. He appears well-developed and well-nourished. He is cooperative.  Non-toxic appearance. He does not appear ill. No distress.   HENT:   Head: Normocephalic and atraumatic.   Right Ear: Hearing, tympanic membrane, external ear and ear canal normal.   Left Ear: Hearing, tympanic membrane, external ear and ear canal normal.   Nose: Nose normal. No mucosal edema, rhinorrhea or nasal deformity. No epistaxis. " Right sinus exhibits no maxillary sinus tenderness and no frontal sinus tenderness. Left sinus exhibits no maxillary sinus tenderness and no frontal sinus tenderness.   Mouth/Throat: Uvula is midline, oropharynx is clear and moist and mucous membranes are normal. No trismus in the jaw. Normal dentition. No uvula swelling. No posterior oropharyngeal erythema.   Eyes: Conjunctivae and lids are normal. No scleral icterus.   Sclera clear bilat   Neck: Trachea normal, full passive range of motion without pain and phonation normal. Neck supple.   Cardiovascular: Normal rate, regular rhythm, normal heart sounds, intact distal pulses and normal pulses.   Pulmonary/Chest: Effort normal and breath sounds normal. No respiratory distress.   Rhonchi to bases, no wheezing   Abdominal: Soft. Normal appearance and bowel sounds are normal. He exhibits no distension. There is no tenderness.   Musculoskeletal: Normal range of motion. He exhibits no edema or deformity.   Neurological: He is alert and oriented to person, place, and time. He exhibits normal muscle tone. Coordination normal.   Skin: Skin is warm, dry and intact. He is not diaphoretic. No pallor.   Psychiatric: He has a normal mood and affect. His speech is normal and behavior is normal. Judgment and thought content normal. Cognition and memory are normal.   Nursing note and vitals reviewed.      Assessment:       1. Generalized body aches    2. Bronchitis    3. Cough        Plan:         Generalized body aches  -     POCT Influenza A/B    Bronchitis    Cough    Other orders  -     azithromycin (Z-GUILHERME) 250 MG tablet; Take 2 tablets by mouth on day 1; Take 1 tablet by mouth on days 2-5  Dispense: 6 tablet; Refill: 0  -     benzonatate (TESSALON PERLES) 100 MG capsule; Take 1 capsule (100 mg total) by mouth 3 (three) times daily as needed for Cough.  Dispense: 30 capsule; Refill: 1  -     predniSONE (DELTASONE) 20 MG tablet; Take 2 tablets (40 mg total) by mouth once daily.  for 5 days  Dispense: 10 tablet; Refill: 0       flu swab negative

## 2018-10-26 NOTE — PATIENT INSTRUCTIONS
Bronchitis, Antibiotic Treatment (Adult)    Bronchitis is an infection of the air passages (bronchial tubes) in your lungs. It often occurs when you have a cold. This illness is contagious during the first few days and is spread through the air by coughing and sneezing, or by direct contact (touching the sick person and then touching your own eyes, nose, or mouth).  Symptoms of bronchitis include cough with mucus (phlegm) and low-grade fever. Bronchitis usually lasts 7 to 14 days. Mild cases can be treated with simple home remedies. More severe infection is treated with an antibiotic.  Home care  Follow these guidelines when caring for yourself at home:  · If your symptoms are severe, rest at home for the first 2 to 3 days. When you go back to your usual activities, don't let yourself get too tired.  · Do not smoke. Also avoid being exposed to secondhand smoke.  · You may use over-the-counter medicines to control fever or pain, unless another medicine was prescribed. (Note: If you have chronic liver or kidney disease or have ever had a stomach ulcer or gastrointestinal bleeding, talk with your healthcare provider before using these medicines. Also talk to your provider if you are taking medicine to prevent blood clots.) Aspirin should never be given to anyone younger than 18 years of age who is ill with a viral infection or fever. It may cause severe liver or brain damage.  · Your appetite may be poor, so a light diet is fine. Avoid dehydration by drinking 6 to 8 glasses of fluids per day (such as water, soft drinks, sports drinks, juices, tea, or soup). Extra fluids will help loosen secretions in the nose and lungs.  · Over-the-counter cough, cold, and sore-throat medicines will not shorten the length of the illness, but they may be helpful to reduce symptoms. (Note: Do not use decongestants if you have high blood pressure.)  · Finish all antibiotic medicine. Do this even if you are feeling better after only a  few days.  Follow-up care  Follow up with your healthcare provider, or as advised. If you had an X-ray or ECG (electrocardiogram), a specialist will review it. You will be notified of any new findings that may affect your care.  Note: If you are age 65 or older, or if you have a chronic lung disease or condition that affects your immune system, or you smoke, talk to your healthcare provider about having pneumococcal vaccinations and a yearly influenza vaccination (flu shot).  When to seek medical advice  Call your healthcare provider right away if any of these occur:  · Fever of 100.4°F (38°C) or higher  · Coughing up increased amounts of colored sputum  · Weakness, drowsiness, headache, facial pain, ear pain, or a stiff neck  Call 911, or get immediate medical care  Contact emergency services right away if any of these occur.  · Coughing up blood  · Worsening weakness, drowsiness, headache, or stiff neck  · Trouble breathing, wheezing, or pain with breathing  Date Last Reviewed: 9/13/2015  © 9342-2903 [employer] [address] [place] [address], [place] [address]. All rights reserved. This information is not intended as a substitute for professional medical care. Always follow your healthcare professional's instructions.

## 2018-10-26 NOTE — LETTER
October 26, 2018      [facility]  [address] [facility] [address]  Phone: [phone]       Patient: Nghia Chahal   YOB: 1995  Date of Visit: 10/26/2018    To Whom It May Concern:    Sandra Chahal  was at [facility] on 10/26/2018. He may return to work/school on 10/27/18 with no restrictions. If you have any questions or concerns, or if I can be of further assistance, please do not hesitate to contact me.    Sincerely,    Jia Ray, NP

## 2018-11-01 ENCOUNTER — OFFICE VISIT (OUTPATIENT)
Dept: URGENT CARE | Facility: CLINIC | Age: 23
End: 2018-11-01
Payer: COMMERCIAL

## 2018-11-01 VITALS
RESPIRATION RATE: 16 BRPM | HEART RATE: 116 BPM | WEIGHT: 217 LBS | DIASTOLIC BLOOD PRESSURE: 81 MMHG | SYSTOLIC BLOOD PRESSURE: 133 MMHG | TEMPERATURE: 98 F | BODY MASS INDEX: 32.05 KG/M2 | OXYGEN SATURATION: 96 %

## 2018-11-01 DIAGNOSIS — J06.9 UPPER RESPIRATORY TRACT INFECTION, UNSPECIFIED TYPE: Primary | ICD-10-CM

## 2018-11-01 DIAGNOSIS — R06.2 WHEEZING: ICD-10-CM

## 2018-11-01 PROCEDURE — 71046 X-RAY EXAM CHEST 2 VIEWS: CPT | Mod: S$GLB,,, | Performed by: NURSE PRACTITIONER

## 2018-11-01 PROCEDURE — 3008F BODY MASS INDEX DOCD: CPT | Mod: CPTII,S$GLB,, | Performed by: NURSE PRACTITIONER

## 2018-11-01 PROCEDURE — 99215 OFFICE O/P EST HI 40 MIN: CPT | Mod: 25,S$GLB,, | Performed by: NURSE PRACTITIONER

## 2018-11-01 PROCEDURE — 96372 THER/PROPH/DIAG INJ SC/IM: CPT | Mod: S$GLB,,, | Performed by: NURSE PRACTITIONER

## 2018-11-01 RX ORDER — ALBUTEROL SULFATE 0.83 MG/ML
2.5 SOLUTION RESPIRATORY (INHALATION)
Status: COMPLETED | OUTPATIENT
Start: 2018-11-01 | End: 2018-11-01

## 2018-11-01 RX ORDER — ALBUTEROL SULFATE 0.83 MG/ML
2.5 SOLUTION RESPIRATORY (INHALATION) EVERY 6 HOURS PRN
Qty: 1 BOX | Refills: 1 | Status: SHIPPED | OUTPATIENT
Start: 2018-11-01 | End: 2019-11-01

## 2018-11-01 RX ORDER — DEXAMETHASONE SODIUM PHOSPHATE 4 MG/ML
8 INJECTION, SOLUTION INTRA-ARTICULAR; INTRALESIONAL; INTRAMUSCULAR; INTRAVENOUS; SOFT TISSUE
Status: COMPLETED | OUTPATIENT
Start: 2018-11-01 | End: 2018-11-01

## 2018-11-01 RX ORDER — PROMETHAZINE HYDROCHLORIDE AND DEXTROMETHORPHAN HYDROBROMIDE 6.25; 15 MG/5ML; MG/5ML
5 SYRUP ORAL EVERY 4 HOURS PRN
Qty: 240 ML | Refills: 0 | Status: SHIPPED | OUTPATIENT
Start: 2018-11-01 | End: 2018-11-11

## 2018-11-01 RX ORDER — IPRATROPIUM BROMIDE 0.5 MG/2.5ML
0.5 SOLUTION RESPIRATORY (INHALATION)
Status: COMPLETED | OUTPATIENT
Start: 2018-11-01 | End: 2018-11-01

## 2018-11-01 RX ORDER — LEVOFLOXACIN 750 MG/1
750 TABLET ORAL DAILY
Qty: 7 TABLET | Refills: 0 | Status: SHIPPED | OUTPATIENT
Start: 2018-11-01 | End: 2018-11-08

## 2018-11-01 RX ADMIN — DEXAMETHASONE SODIUM PHOSPHATE 8 MG: 4 INJECTION, SOLUTION INTRA-ARTICULAR; INTRALESIONAL; INTRAMUSCULAR; INTRAVENOUS; SOFT TISSUE at 10:11

## 2018-11-01 RX ADMIN — ALBUTEROL SULFATE 2.5 MG: 0.83 SOLUTION RESPIRATORY (INHALATION) at 11:11

## 2018-11-01 RX ADMIN — IPRATROPIUM BROMIDE 0.5 MG: 0.5 SOLUTION RESPIRATORY (INHALATION) at 10:11

## 2018-11-01 RX ADMIN — ALBUTEROL SULFATE 2.5 MG: 0.83 SOLUTION RESPIRATORY (INHALATION) at 10:11

## 2018-11-01 NOTE — PROGRESS NOTES
Subjective: cough getting worse, runny nose, congestion       Patient ID: Nghia Chahal is a 23 y.o. male.    Vitals:  weight is 98.4 kg (217 lb). His temperature is 97.6 °F (36.4 °C). His blood pressure is 133/81 and his pulse is 116 (abnormal). His respiration is 16 and oxygen saturation is 96%.     Chief Complaint: Cough    Presents with cough, congestion, wheezing for two weeks. Was seen a week ago and dx'ed with bronchitis, states is now much worse. Subjective fever and chills, non- productive cough      Cough   This is a recurrent problem. The current episode started 1 to 4 weeks ago. The problem has been rapidly worsening. The problem occurs every few minutes. The cough is non-productive. Associated symptoms include chills, a fever, myalgias, nasal congestion, postnasal drip, rhinorrhea, shortness of breath and wheezing. Pertinent negatives include no chest pain, headaches, rash or sore throat.     Review of Systems   Constitution: Positive for chills, fever, weakness, malaise/fatigue and night sweats.   HENT: Positive for congestion, postnasal drip and rhinorrhea. Negative for sore throat.    Eyes: Negative for blurred vision.   Cardiovascular: Negative for chest pain.   Respiratory: Positive for cough, shortness of breath and wheezing.    Skin: Negative for rash.   Musculoskeletal: Positive for myalgias. Negative for back pain and joint pain.   Gastrointestinal: Negative for abdominal pain, diarrhea, nausea and vomiting.   Neurological: Negative for headaches.   Psychiatric/Behavioral: The patient is not nervous/anxious.    All other systems reviewed and are negative.      Objective:      Physical Exam   Constitutional: He is oriented to person, place, and time. He appears well-developed and well-nourished. He is cooperative.  Non-toxic appearance. He does not appear ill. No distress.   HENT:   Head: Normocephalic and atraumatic.   Right Ear: Hearing, tympanic membrane, external ear and ear canal normal.    Left Ear: Hearing, tympanic membrane, external ear and ear canal normal.   Nose: Mucosal edema, rhinorrhea and sinus tenderness present. No nasal deformity. No epistaxis. Right sinus exhibits maxillary sinus tenderness. Right sinus exhibits no frontal sinus tenderness. Left sinus exhibits maxillary sinus tenderness. Left sinus exhibits no frontal sinus tenderness.   Mouth/Throat: Uvula is midline and mucous membranes are normal. No trismus in the jaw. Normal dentition. No uvula swelling. Posterior oropharyngeal erythema present.   Eyes: Conjunctivae and lids are normal. No scleral icterus.   Sclera clear bilat   Neck: Trachea normal, full passive range of motion without pain and phonation normal. Neck supple.   Cardiovascular: Normal rate, regular rhythm, normal heart sounds, intact distal pulses and normal pulses.   Pulmonary/Chest: Accessory muscle usage present. He is in respiratory distress. He has wheezes in the right upper field, the right middle field, the right lower field, the left upper field, the left middle field and the left lower field. He has rhonchi in the right upper field, the right middle field, the right lower field, the left upper field, the left middle field and the left lower field.   Abdominal: Soft. Normal appearance and bowel sounds are normal. He exhibits no distension. There is no tenderness.   Musculoskeletal: Normal range of motion. He exhibits no edema or deformity.   Neurological: He is alert and oriented to person, place, and time. He exhibits normal muscle tone. Coordination normal.   Skin: Skin is warm, dry and intact. He is not diaphoretic. No pallor.   Psychiatric: He has a normal mood and affect. His speech is normal and behavior is normal. Judgment and thought content normal. Cognition and memory are normal.   Nursing note and vitals reviewed.      Assessment:       No diagnosis found.    Plan:         There are no diagnoses linked to this encounter.

## 2018-11-01 NOTE — LETTER
November 1, 2018      [facility]  [address]  [facility] [address]  Phone: [phone]       Patient: Nghia Chahal   YOB: 1995  Date of Visit: 11/01/2018    To Whom It May Concern:    Sandra Chahal  was at [facility] on 11/01/2018. He may return to work/school on 11/3/18 with no restrictions. If you have any questions or concerns, or if I can be of further assistance, please do not hesitate to contact me.    Sincerely,    APOORVA Sims

## 2018-11-01 NOTE — PROGRESS NOTES
Continues to have extensive wheezing and rhonchi to all lung fields. Will give a second albuterol treatment and reassess

## 2018-11-01 NOTE — PROGRESS NOTES
Marked improvement after second breathing treatment. Continues to wheeze but less rhonchi and states he does feel better. Feels ok to go home and continue treatments there.

## 2018-11-01 NOTE — PATIENT INSTRUCTIONS
Viral Upper Respiratory Illness with Wheezing (Adult)  You have a viral upper respiratory illness (URI), which is another term for the common cold. When the infection causes a lot of irritation, the air passages can go into spasm. This causes wheezing and shortness of breath.    This illness is contagious during the first few days. It is spread through the air by coughing and sneezing. It may also be spread by direct contact (touching the sick person and then touching your own eyes, nose, or mouth). Frequent handwashing will decrease the risk.  Most viral illnesses go away within 7 to 10 days with rest and simple home remedies. Sometimes the illness may last for several weeks. Antibiotics will not kill a virus, and they are generally not prescribed for this condition.  Home care  · If symptoms are severe, rest at home for the first 2 to 3 days. When you resume activity, don't let yourself get too tired.  · Avoid being exposed to cigarette smoke (yours or others).  · You may use acetaminophen or ibuprofen to control pain and fever, unless another medicine was prescribed. (Note: If you have chronic liver or kidney disease, have ever had a stomach ulcer or gastrointestinal bleeding, or are taking blood-thinning medicines, talk with your healthcare provider before using these medicines.) Aspirin should never be given to anyone under 18 years of age who is ill with a viral infection or fever. It may cause severe liver or brain damage.  · Your appetite may be poor, so a light diet is fine. Avoid dehydration by drinking 6 to 8 glasses of fluids per day (water, soft drinks, juices, tea, or soup). Extra fluids will help loosen secretions in the nose and lungs.  · Over-the-counter cold medicines will not shorten the length of time youre sick, but they may be helpful for the following symptoms: cough, sore throat, and nasal and sinus congestion. (Note: Do not use decongestants if you have high blood pressure.)  Follow-up  care  Follow up with your healthcare provider, or as advised.  When to seek medical advice  Call your healthcare provider right away if any of these occur:  · Cough with lots of colored sputum (mucus)  · Severe headache; face, neck, or ear pain  · Difficulty swallowing due to throat pain  · Fever of 100.4ºF (38ºC) or higher, or as directed by your healthcare provider  Call 911, or get immediate medical care  Call emergency services right away if any of these occur:  · Chest pain, shortness of breath, worsening wheezing, or difficulty breathing  · Coughing up blood  · Inability to swallow due to throat pain  Date Last Reviewed: 9/13/2015  © 0895-9389 [employer]. [address] [place] [address], [place] [address]. All rights reserved. This information is not intended as a substitute for professional medical care. Always follow your healthcare professional's instructions.

## 2018-11-30 ENCOUNTER — PATIENT MESSAGE (OUTPATIENT)
Dept: SLEEP MEDICINE | Facility: CLINIC | Age: 23
End: 2018-11-30

## 2018-11-30 ENCOUNTER — TELEPHONE (OUTPATIENT)
Dept: SLEEP MEDICINE | Facility: CLINIC | Age: 23
End: 2018-11-30

## 2018-12-04 ENCOUNTER — TELEPHONE (OUTPATIENT)
Dept: SLEEP MEDICINE | Facility: CLINIC | Age: 23
End: 2018-12-04

## 2018-12-05 ENCOUNTER — OFFICE VISIT (OUTPATIENT)
Dept: SLEEP MEDICINE | Facility: CLINIC | Age: 23
End: 2018-12-05
Payer: COMMERCIAL

## 2018-12-05 VITALS
HEART RATE: 90 BPM | SYSTOLIC BLOOD PRESSURE: 116 MMHG | WEIGHT: 227.06 LBS | HEIGHT: 69 IN | BODY MASS INDEX: 33.63 KG/M2 | DIASTOLIC BLOOD PRESSURE: 73 MMHG

## 2018-12-05 DIAGNOSIS — G47.10 HYPERSOMNIA: Primary | ICD-10-CM

## 2018-12-05 DIAGNOSIS — G47.33 OBSTRUCTIVE SLEEP APNEA: ICD-10-CM

## 2018-12-05 PROCEDURE — 99214 OFFICE O/P EST MOD 30 MIN: CPT | Mod: S$GLB,,, | Performed by: NURSE PRACTITIONER

## 2018-12-05 PROCEDURE — 99999 PR PBB SHADOW E&M-EST. PATIENT-LVL IV: CPT | Mod: PBBFAC,,, | Performed by: NURSE PRACTITIONER

## 2018-12-05 PROCEDURE — 3008F BODY MASS INDEX DOCD: CPT | Mod: CPTII,S$GLB,, | Performed by: NURSE PRACTITIONER

## 2018-12-05 RX ORDER — METHYLPHENIDATE HYDROCHLORIDE 5 MG/1
TABLET ORAL
Qty: 60 TABLET | Refills: 0 | Status: SHIPPED | OUTPATIENT
Start: 2018-12-05 | End: 2019-01-16 | Stop reason: DRUGHIGH

## 2018-12-05 NOTE — PROGRESS NOTES
Nghia Chahal seen in follow-up for JAYLEN management and CPAP equipment check.    INTERVAL HISTORY:    12/05/2018 HESHAM Javier NP: Since last clinic visit, pt has completed PSG/titration study followed by MSLT.     JAYLEN - continues to use CPAP nightly with resolution of snoring and interrupted sleep with APAP 12-20 cm. Mother, who accompanied pt today, reports some breakthrough snoring despite properly fitting AirFit F20. Denies oral/nasal drying. Denies overt air leaks. Overall finds CPAP helpful.     CPAP Interrogation: APAP 12-20 cm  Compliance Summary   Days with Device Usage: 29 days Percentage of Days >=4 Hours: 83.3% Average Usage (Days Used): 5 hrs. 54 mins. 9 secs. Average Usage (All Days): 5 hrs. 42 mins. 21 secs  Apnea Indices Average AHI: 1.2 Average OA Index: 0.5 Average CA Index: 0.0   Large Leak Average Time in Large Leak: 12 secs. Average % of Night in Large Leak: 0.1%   Periodic Breathing Average % of Night in PB: 0.3%   90%tile pressure: 15.1 cm    Discussed titration sleep study and MSLT results.   Continues to have significant daytime sleepiness despite Concerta ER 36 mg QAM. Takes 400 mg caffeine tablets plus supplements with soda (Mt. Dew) for energy through out the day. Soda consumption has caused 25 lb weight gain. He especially needs more alertness during working hours, which is usually where he consumes most caffeinated drinks.   Pt understands that since he was unable to discontinue Concerta ER the recommended 7-day period that this may have affected MSLT results.     Insomnia - no longer taking Trazodone. Finds that regular work schedule tires him out more and sleep onset better controlled. Sleep onset latency < 1.5 hours which is acceptable and once asleep able to stay asleep between 5 - 8 hours per sleep tracker.      05/02/2018 HESHAM Javier NP: Pt returns after set up of PAP machine on 03/23/2018 at [facility]. Pt sleep complaints of snoring, and poor sleep quality now resolved with PAP use.  "Reports that nocturia of up to 6 x per night resolved, "I don't wake up anymore". Also has weaned off Trazodone 300 mg, now only taking 50 - 100 mg dosing. He has also stopped taking clonidine. Goal is to stop taking sleep meds altogether. However despite averaging nearly 8 hours of sleep with CPAP nightly and taking Concerta 36 mg daily, he continues to have excessive daytime sleepiness. Reports that he finds himself dozing off while driving and he struggles to stay awake while at work. He finds daytime sleepiness disabling.      Denies oral/nasal drying with Airfit F20 mask. Initially got size small mask which caused skin irritation; pt's mother had an extra mask size large which he uses that fits better. Denies mask leaks. Denies rainout.  Denies pressure intolerance or air hunger. Denies aerophagia. Pt's machine originally prescribed 4 - 20 cm but he had air hunger and self-adjusted machine settings to 12 - 20 cm.     Since starting CPAP, chronic nasal congestion has worsened despite taking Flonase, Astelin, and Singulair. Pt also using heated tubing with CPAP.     EPWORTH SLEEPINESS SCALE 3/13/2018   Sitting and reading 0   Watching TV 0   Sitting, inactive in a public place (e.g. a theatre or a meeting)s 1   As a passenger in a car for an hour without a break 3   Lying down to rest in the afternoon when circumstances permit 0   Sitting and talking to someone 0   Sitting quietly after a lunch without alcohol 0   In a car, while stopped for a few minutes in traffic 3   Total score 7       CPAP Interrogation: APAP 12-20 cm  Compliance Summary Days with Device Usage: 30 days Percentage of Days >=4 Hours: 100.0% Average Usage (Days Used): 7 hrs. 57 mins. 52 secs. Average Usage (All Days): 7 hrs. 57 mins. 52 secs  Apnea Indices Average AHI: 1.3 Average OA Index: 0.4 Average CA Index: 0.2   Large Leak Average Time in Large Leak: 3 mins. 10 secs. Average % of Night in Large Leak: 0.7%   Periodic Breathing Average % of " "Night in PB: 0.2%  90%tile pressure: 15.8 cm      03/14/2018 HESHAM Javier NP: Of note, in -lab PSG denied by insurance; HST ordered instead. Discussed 02/21/2018 home sleep study results in detail. Pt complaints of snoring, poor sleep quality, and excessive daytime sleepiness remain the same.     Continues to take Concentra 36 mg daily for ADHD and Trazodone 300 mg w/ Clonidine 0.1 mg for insomnia. Both managed by PCP.     EPWORTH SLEEPINESS SCALE 3/13/2018   Sitting and reading 0   Watching TV 0   Sitting, inactive in a public place (e.g. a theatre or a meeting) 1   As a passenger in a car for an hour without a break 3   Lying down to rest in the afternoon when circumstances permit 0   Sitting and talking to someone 0   Sitting quietly after a lunch without alcohol 0   In a car, while stopped for a few minutes in traffic 3   Total score 7          01/24/2018 Dr. Michaels This 23 y.o. male patient presents for the evaluation of possible obstructive sleep apnea.    Patient not sleeping well since age 13.  Has tried numerous medications, but not working too well. Variable response.  Can feel hung over the next day    Takes 0.1 mg clonidine at bedtime and 300 mg trazodone at bedtime  Tried melatonin in the past.    He reports difficulty initiating sleep at night.  Not sleepy at bedtime, like morning "just struck".  Actually doesn't feel sleepy until around 6 am, but then still lies awake    He tried eliminating electronics from bedroom, darkening shades    In past, tried Concerta 36 mg for ADHD (tired, memory fog, daytime concentration difficulties) still taking    ESS = 1    Snoring when sleeping  Restless in sleep, toss and turn    Denies restless legs feeling      SLEEP ROUTINE:   Bed partner: own room   Time to bed: varies 10-11 pm   Sleep onset latency: prolonged   Disruptions or awakenings: none   Wakeup time: 6:30 am (work) if not having to work, stays in bed until about 10 am   Perceived sleep quality: poor   Daytime " "naps: none    Titration followed by MSLT 09/24/2018 189 lb. Effective control of sleep disordered breathing was seen in supine REM sleep on 14 cm H2O.   MSLT: Next day, for the MSLT 5 naps were recorded at 2 hour intervals, for approximately 20 minutes duration each, starting at a lights out time of 7: 15 AM for Nap 1. He fell asleep on 5/5 naps and developed sleep onset REM periods (SOREMPs) on 0/5 naps. The sleep onset latency for Naps 1 through 5 were 1:30 min, 00:30 min, 04:30 min, 05:00 min and 08:00 min, respectively. The 5 nap-mean sleep latency was severily diminished at 03:54 minutes. The patient felt that he fell asleep on naps. Urine drug screen on the morning of the MSLT was presumptive positive for amphetamine.   Baseline Sleep Study: 02/21/2018  lb. The overall AHI was 14 and overall RDI was 41. The oxygen yas was 87.5 % and % time < 90% SpO2 was 0.1 %.      Past Medical History:   Diagnosis Date    Asthma     Fibromyalgia     Gastritis     Insomnia     Sleep apnea        History reviewed. No pertinent surgical history.    Family History   Problem Relation Age of Onset    Sleep apnea Mother     Retinal detachment Neg Hx     Macular degeneration Neg Hx     Glaucoma Neg Hx        Social History     Tobacco Use    Smoking status: Never Smoker   Substance Use Topics    Alcohol use: No    Drug use: Not on file       ALLERGIES: Reviewed in EPIC    CURRENT MEDICATIONS: Reviewed in EPIC    REVIEW OF SYSTEMS:  Sleep related symptoms as per HPI;    Denies weight gain;    Sometimes sinus problems;    Denies dyspnea;    Denies palpitations;    Denies acid reflux;    Denies polyuria;    sometimes body aches   Denies headaches;    sometimes mood disturbance;    Denies anemia;    Otherwise, a balance of systems reviewed is negative.    PHYSICAL EXAM:  /73   Pulse 90   Ht 5' 9" (1.753 m)   Wt 103 kg (227 lb 1.2 oz)   BMI 33.53 kg/m²   GENERAL: Well groomed; Normally " developed;    ASSESSMENT:    Obstructive sleep apnea, mild by AHI, severe by RDI. The patient symptomatically has snoring, and poor sleep quality resolved with CPAP use. Currently having some breakthrough snoring with APAP 12 - 20 cm. The patient is adherent on CPAP and experiencing symptomatic benefit. Medical co-morbidities: overweight BMI.     Persistent hypersomnia, despite JAYLEN being effectively treated and daily Concerta 36 mg. Pt not interested in additional medications ie stimulants unless it is warranted and condition is test-proven; underwent MSLT with No SOREMs on 0/5 naps, sleep latency severely diminished 3:54 minutes; pt tested urine + for amphetamine since pt only stopped Concerta 3 days prior to MSLT vs recommended 7 days, possible MSLT was falsely negative; pt with strong paternal family hx for narcolepsy     Insomnia NEC, resolved, has self-weaned Trazodone completely. More active during the day with regular work schedule which has helped with insomnia.     Chronic nasal congestion, adequately managed, continues to use Flonase, Astelin, heated tubing, and Singulair; consulted ENT, ultimately allergy testing recommended since pt prefers conservative/nonsurgical options    PLAN:    Treatment:    JAYLEN: change APAP to fixed CPAP 14 cm per recent titration.   Hypersomnia: discussed definitive diagnosis narcolepsy requires repeat PSG followed by MSLT and dc of Concerta ER 7 days prior which is not feasible for pt, in addition further sleep testing is cost prohibitive; agreed to trial medical management. Continue methylphenidate ER 36 mg qam. Add methylphenidate IR 5 mg, one tablet before breakfast and one tablet before lunch. Avoid caffeine tabs and sodas.   Insomnia: continue current sleep hygiene efforts, in addition, do not stay in bed if not sleeping within 20 minutes, discussed alternative activities like reading/relaxation instead    RTC 4 - 6 weeks.     Education: During our discussion today, we  talked about the etiology of obstructive sleep apnea as well as the potential ramifications of untreated sleep apnea, which could include daytime sleepiness, hypertension, heart disease and/or stroke. We discussed potential treatment options, which could include weight loss, body positioning, continuous positive airway pressure (CPAP), OA, EPAP, or referral for surgical consideration.    Precautions: The patient was advised to abstain from driving should they feel sleepy  or drowsy.

## 2019-01-16 ENCOUNTER — OFFICE VISIT (OUTPATIENT)
Dept: SLEEP MEDICINE | Facility: CLINIC | Age: 24
End: 2019-01-16
Payer: COMMERCIAL

## 2019-01-16 VITALS
HEART RATE: 76 BPM | DIASTOLIC BLOOD PRESSURE: 66 MMHG | WEIGHT: 221.13 LBS | BODY MASS INDEX: 32.75 KG/M2 | HEIGHT: 69 IN | SYSTOLIC BLOOD PRESSURE: 120 MMHG

## 2019-01-16 DIAGNOSIS — G47.30 HYPERSOMNIA WITH SLEEP APNEA: Primary | ICD-10-CM

## 2019-01-16 DIAGNOSIS — G47.10 HYPERSOMNIA WITH SLEEP APNEA: Primary | ICD-10-CM

## 2019-01-16 PROCEDURE — 3008F BODY MASS INDEX DOCD: CPT | Mod: CPTII,S$GLB,, | Performed by: NURSE PRACTITIONER

## 2019-01-16 PROCEDURE — 99999 PR PBB SHADOW E&M-EST. PATIENT-LVL IV: CPT | Mod: PBBFAC,,, | Performed by: NURSE PRACTITIONER

## 2019-01-16 PROCEDURE — 99214 PR OFFICE/OUTPT VISIT, EST, LEVL IV, 30-39 MIN: ICD-10-PCS | Mod: S$GLB,,, | Performed by: NURSE PRACTITIONER

## 2019-01-16 PROCEDURE — 3008F PR BODY MASS INDEX (BMI) DOCUMENTED: ICD-10-PCS | Mod: CPTII,S$GLB,, | Performed by: NURSE PRACTITIONER

## 2019-01-16 PROCEDURE — 99214 OFFICE O/P EST MOD 30 MIN: CPT | Mod: S$GLB,,, | Performed by: NURSE PRACTITIONER

## 2019-01-16 PROCEDURE — 99999 PR PBB SHADOW E&M-EST. PATIENT-LVL IV: ICD-10-PCS | Mod: PBBFAC,,, | Performed by: NURSE PRACTITIONER

## 2019-01-16 RX ORDER — METHYLPHENIDATE HYDROCHLORIDE 36 MG/1
36 TABLET ORAL EVERY MORNING
Qty: 30 TABLET | Refills: 0 | Status: SHIPPED | OUTPATIENT
Start: 2019-02-02 | End: 2019-02-22 | Stop reason: SDUPTHER

## 2019-01-16 RX ORDER — METHYLPHENIDATE HYDROCHLORIDE 5 MG/1
5 TABLET ORAL
Qty: 90 TABLET | Refills: 0 | Status: SHIPPED | OUTPATIENT
Start: 2019-01-16 | End: 2019-02-22 | Stop reason: SDUPTHER

## 2019-01-16 NOTE — PROGRESS NOTES
Nghia Chahal seen in follow-up for JAYLEN management with hypersomnia, initial visit with me    INTERVAL HISTORY:  1/16/19:   Ritalin 5mg with CR 36mg helps him get through am until 12p then takes 5mg ritalin which helps reduce daytime sleepiness until 5pm. Still sleepy for remainder of shift at work. Gets off 11pm. Denies mood imbalance, nausea, insomnnia or headaches. Remains adherent with cpap 14cm. Using nightly. Mask cushion and straps worn  Interrogation- AHI 2.1, avg 5.6h/n. F20 mask    09/24/2018 HESHAM Javier NP: Since last clinic visit, pt has completed PSG/titration study followed by MSLT.     JAYLEN - continues to use CPAP nightly with resolution of snoring and interrupted sleep with APAP 12-20 cm. Mother, who accompanied pt today, reports some breakthrough snoring despite properly fitting AirFit F20. Denies oral/nasal drying. Denies overt air leaks. Overall finds CPAP helpful.     CPAP Interrogation: APAP 12-20 cm  Compliance Summary   Days with Device Usage: 29 days Percentage of Days >=4 Hours: 83.3% Average Usage (Days Used): 5 hrs. 54 mins. 9 secs. Average Usage (All Days): 5 hrs. 42 mins. 21 secs  Apnea Indices Average AHI: 1.2 Average OA Index: 0.5 Average CA Index: 0.0   Large Leak Average Time in Large Leak: 12 secs. Average % of Night in Large Leak: 0.1%   Periodic Breathing Average % of Night in PB: 0.3%   90%tile pressure: 15.1 cm    Discussed titration sleep study and MSLT results.   Continues to have significant daytime sleepiness despite Concerta ER 36 mg QAM. Takes 400 mg caffeine tablets plus supplements with soda (Mt. Dew) for energy through out the day. Soda consumption has caused 25 lb weight gain. He especially needs more alertness during working hours, which is usually where he consumes most caffeinated drinks.   Pt understands that since he was unable to discontinue Concerta ER the recommended 7-day period that this may have affected MSLT results.     Insomnia - no longer taking  "Trazodone. Finds that regular work schedule tires him out more and sleep onset better controlled. Sleep onset latency < 1.5 hours which is acceptable and once asleep able to stay asleep between 5 - 8 hours per sleep tracker.      05/02/2018 HESHAM Javier NP: Pt returns after set up of PAP machine on 03/23/2018 at [facility]. Pt sleep complaints of snoring, and poor sleep quality now resolved with PAP use. Reports that nocturia of up to 6 x per night resolved, "I don't wake up anymore". Also has weaned off Trazodone 300 mg, now only taking 50 - 100 mg dosing. He has also stopped taking clonidine. Goal is to stop taking sleep meds altogether. However despite averaging nearly 8 hours of sleep with CPAP nightly and taking Concerta 36 mg daily, he continues to have excessive daytime sleepiness. Reports that he finds himself dozing off while driving and he struggles to stay awake while at work. He finds daytime sleepiness disabling.      Denies oral/nasal drying with Airfit F20 mask. Initially got size small mask which caused skin irritation; pt's mother had an extra mask size large which he uses that fits better. Denies mask leaks. Denies rainout.  Denies pressure intolerance or air hunger. Denies aerophagia. Pt's machine originally prescribed 4 - 20 cm but he had air hunger and self-adjusted machine settings to 12 - 20 cm.     Since starting CPAP, chronic nasal congestion has worsened despite taking Flonase, Astelin, and Singulair. Pt also using heated tubing with CPAP.     EPWORTH SLEEPINESS SCALE 3/13/2018   Sitting and reading 0   Watching TV 0   Sitting, inactive in a public place (e.g. a theatre or a meeting)s 1   As a passenger in a car for an hour without a break 3   Lying down to rest in the afternoon when circumstances permit 0   Sitting and talking to someone 0   Sitting quietly after a lunch without alcohol 0   In a car, while stopped for a few minutes in traffic 3   Total score 7       CPAP Interrogation: APAP 12-20 " "cm  Compliance Summary Days with Device Usage: 30 days Percentage of Days >=4 Hours: 100.0% Average Usage (Days Used): 7 hrs. 57 mins. 52 secs. Average Usage (All Days): 7 hrs. 57 mins. 52 secs  Apnea Indices Average AHI: 1.3 Average OA Index: 0.4 Average CA Index: 0.2   Large Leak Average Time in Large Leak: 3 mins. 10 secs. Average % of Night in Large Leak: 0.7%   Periodic Breathing Average % of Night in PB: 0.2%  90%tile pressure: 15.8 cm      03/14/2018 HESHAM Javier NP: Of note, in -lab PSG denied by insurance; HST ordered instead. Discussed 02/21/2018 home sleep study results in detail. Pt complaints of snoring, poor sleep quality, and excessive daytime sleepiness remain the same.     Continues to take Concentra 36 mg daily for ADHD and Trazodone 300 mg w/ Clonidine 0.1 mg for insomnia. Both managed by PCP.     EPWORTH SLEEPINESS SCALE 3/13/2018   Sitting and reading 0   Watching TV 0   Sitting, inactive in a public place (e.g. a theatre or a meeting) 1   As a passenger in a car for an hour without a break 3   Lying down to rest in the afternoon when circumstances permit 0   Sitting and talking to someone 0   Sitting quietly after a lunch without alcohol 0   In a car, while stopped for a few minutes in traffic 3   Total score 7          01/24/2018 Dr. Michaels This 23 y.o. male patient presents for the evaluation of possible obstructive sleep apnea.    Patient not sleeping well since age 13.  Has tried numerous medications, but not working too well. Variable response.  Can feel hung over the next day    Takes 0.1 mg clonidine at bedtime and 300 mg trazodone at bedtime  Tried melatonin in the past.    He reports difficulty initiating sleep at night.  Not sleepy at bedtime, like morning "just struck".  Actually doesn't feel sleepy until around 6 am, but then still lies awake    He tried eliminating electronics from bedroom, darkening shades    In past, tried Concerta 36 mg for ADHD (tired, memory fog, daytime " "concentration difficulties) still taking    ESS = 1    Snoring when sleeping  Restless in sleep, toss and turn    Denies restless legs feeling      SLEEP ROUTINE:   Bed partner: own room   Time to bed: varies 10-11 pm   Sleep onset latency: prolonged   Disruptions or awakenings: none   Wakeup time: 6:30 am (work) if not having to work, stays in bed until about 10 am   Perceived sleep quality: poor   Daytime naps: none    Titration followed by MSLT 09/24/2018 189 lb. Effective control of sleep disordered breathing was seen in supine REM sleep on 14 cm H2O.   MSLT: Next day, for the MSLT 5 naps were recorded at 2 hour intervals, for approximately 20 minutes duration each, starting at a lights out time of 7: 15 AM for Nap 1. He fell asleep on 5/5 naps and developed sleep onset REM periods (SOREMPs) on 0/5 naps. The sleep onset latency for Naps 1 through 5 were 1:30 min, 00:30 min, 04:30 min, 05:00 min and 08:00 min, respectively. The 5 nap-mean sleep latency was severily diminished at 03:54 minutes. The patient felt that he fell asleep on naps. Urine drug screen on the morning of the MSLT was presumptive positive for amphetamine.   Baseline Sleep Study: 02/21/2018  lb. The overall AHI was 14 and overall RDI was 41. The oxygen yas was 87.5 % and % time < 90% SpO2 was 0.1 %.      REVIEW OF SYSTEMS:  Sleep related symptoms as per HPI; 6# loss. Otherwise a balance review of 10-systems is negative.     PHYSICAL EXAM:  /66   Pulse 76   Ht 5' 9" (1.753 m)   Wt 100.3 kg (221 lb 1.9 oz)   BMI 32.65 kg/m²   GENERAL: Well groomed; Normally developed; obese    ASSESSMENT:    Obstructive sleep apnea, mild by AHI, severe by RDI. The patient symptomatically has snoring, and poor sleep quality resolved with CPAP use. Currently having some breakthrough snoring with APAP 12 - 20 cm. The patient is adherent on CPAP and experiencing symptomatic benefit 1/16/19: Excellent adherence with fixed cpap 14cm. AHI<5. Benefiting " from therapy but persistent hypersomnia. MSLT negative with 0 SOREMS    Chronic nasal congestion, adequately managed, continues to use Flonase, Astelin, heated tubing, and Singulair; consulted ENT, ultimately allergy testing recommended since pt prefers conservative/nonsurgical options    PLAN:    Adjust to apap mode 14-16cm. Continue use. New mask alternative consider and new straps today. [facility] D[facility]      Continue methylphenidate ER 36 mg qam and Ritalin IR 5 mg but increase from bid to TID for better control.    RTC 3-mos, sooner if needed.

## 2019-02-04 ENCOUNTER — OFFICE VISIT (OUTPATIENT)
Dept: PULMONOLOGY | Facility: CLINIC | Age: 24
End: 2019-02-04
Payer: COMMERCIAL

## 2019-02-04 VITALS
SYSTOLIC BLOOD PRESSURE: 128 MMHG | HEART RATE: 89 BPM | BODY MASS INDEX: 33.63 KG/M2 | WEIGHT: 227.06 LBS | OXYGEN SATURATION: 98 % | HEIGHT: 69 IN | DIASTOLIC BLOOD PRESSURE: 71 MMHG

## 2019-02-04 DIAGNOSIS — G47.33 OSA (OBSTRUCTIVE SLEEP APNEA): Primary | ICD-10-CM

## 2019-02-04 DIAGNOSIS — J45.50 SEVERE PERSISTENT ASTHMA WITHOUT COMPLICATION: ICD-10-CM

## 2019-02-04 DIAGNOSIS — R09.89 CHRONIC SINUS COMPLAINTS: ICD-10-CM

## 2019-02-04 PROCEDURE — 99999 PR PBB SHADOW E&M-EST. PATIENT-LVL IV: ICD-10-PCS | Mod: PBBFAC,,, | Performed by: INTERNAL MEDICINE

## 2019-02-04 PROCEDURE — 99999 PR PBB SHADOW E&M-EST. PATIENT-LVL IV: CPT | Mod: PBBFAC,,, | Performed by: INTERNAL MEDICINE

## 2019-02-04 PROCEDURE — 99205 OFFICE O/P NEW HI 60 MIN: CPT | Mod: S$GLB,,, | Performed by: INTERNAL MEDICINE

## 2019-02-04 PROCEDURE — 3008F BODY MASS INDEX DOCD: CPT | Mod: CPTII,S$GLB,, | Performed by: INTERNAL MEDICINE

## 2019-02-04 PROCEDURE — 3008F PR BODY MASS INDEX (BMI) DOCUMENTED: ICD-10-PCS | Mod: CPTII,S$GLB,, | Performed by: INTERNAL MEDICINE

## 2019-02-04 PROCEDURE — 99205 PR OFFICE/OUTPT VISIT, NEW, LEVL V, 60-74 MIN: ICD-10-PCS | Mod: S$GLB,,, | Performed by: INTERNAL MEDICINE

## 2019-02-04 RX ORDER — AZELASTINE HCL 205.5 UG/1
2 SPRAY NASAL 2 TIMES DAILY
Qty: 30 ML | Refills: 11 | Status: SHIPPED | OUTPATIENT
Start: 2019-02-04 | End: 2021-05-28

## 2019-02-04 RX ORDER — PREDNISONE 20 MG/1
TABLET ORAL
Qty: 36 TABLET | Refills: 2 | Status: SHIPPED | OUTPATIENT
Start: 2019-02-04 | End: 2021-05-28

## 2019-02-04 RX ORDER — BUDESONIDE AND FORMOTEROL FUMARATE DIHYDRATE 160; 4.5 UG/1; UG/1
2 AEROSOL RESPIRATORY (INHALATION) EVERY 12 HOURS
Qty: 1 INHALER | Refills: 11 | Status: SHIPPED | OUTPATIENT
Start: 2019-02-04 | End: 2021-05-28

## 2019-02-04 RX ORDER — FAMOTIDINE 20 MG/1
20 TABLET, FILM COATED ORAL DAILY
COMMUNITY
End: 2021-05-28

## 2019-02-04 RX ORDER — FLUTICASONE PROPIONATE 50 MCG
2 SPRAY, SUSPENSION (ML) NASAL 2 TIMES DAILY
Qty: 2 BOTTLE | Refills: 11 | Status: SHIPPED | OUTPATIENT
Start: 2019-02-04 | End: 2021-05-28

## 2019-02-04 RX ORDER — TIZANIDINE 2 MG/1
TABLET ORAL
COMMUNITY
Start: 2018-11-21 | End: 2021-05-28

## 2019-02-04 RX ORDER — AZITHROMYCIN 500 MG/1
TABLET, FILM COATED ORAL
Qty: 3 TABLET | Refills: 3 | Status: SHIPPED | OUTPATIENT
Start: 2019-02-04 | End: 2021-05-28

## 2019-02-04 RX ORDER — ALBUTEROL SULFATE 90 UG/1
2 AEROSOL, METERED RESPIRATORY (INHALATION) EVERY 6 HOURS PRN
Qty: 18 G | Refills: 11 | Status: SHIPPED | OUTPATIENT
Start: 2019-02-04 | End: 2021-05-28

## 2019-02-04 RX ORDER — DERMATOPHAGOIDES PTERONYSSINUS AND DERMATOPHAGOIDES FARINAE 6; 6 [ARB'U]/1; [ARB'U]/1
TABLET SUBLINGUAL
COMMUNITY
Start: 2018-12-26 | End: 2019-04-03

## 2019-02-04 RX ORDER — MINERAL OIL
180 ENEMA (ML) RECTAL DAILY
COMMUNITY
End: 2021-05-28

## 2019-02-04 NOTE — PATIENT INSTRUCTIONS
For excess sleepiness - consider provigil/nuvigil in place ritalin but ritalin may be helping nerves/attention.    Chr sinus and nasal stuffy- flonase and astelin- afrin 10 minutes  before flonase may help    Severe (by need for prednisone 3-4 yearly)  Persistent asthma -    Use azithromycin for any infection     symbicort 2 twice daily - OR  Breo once daily   asmanex 2 daily inhaled steroid     sinugulair      Rescue- albuterol  Action plan- prednisone - call for early shot if needed.    Immune and allergy check - special rx if needed to be considered.    Add spiriva if gets well covered (only if)

## 2019-02-04 NOTE — PROGRESS NOTES
"2/4/2019    Nghia Chahal  New Patient Consult    Chief Complaint   Patient presents with    Asthma    Establish Care       HPI:     Asthma since child flores, with 2 exacerbations /yr assoc with increase sinus.  Takes prednisone 4 times a year. Typically fails prednisone 60/d x 5 - improves post azithromycin and shot steroids.  Uses breo 200, and singulair, uses sinus abx 3-4/yr.  Had allergy testing and dust mite allery and mold.      Sinus infection 3-4 /yr with chr stuffiness.    pft last done 2-3 yrs ago.        Has osas,  Uses cpap - sept 2018 auto pap 14-20, pt  Reported 14/hr - has dry mouth,     Gets refils on stimulants every months with visits every 6 months.  Uses mehtylphenidate since age 16.     Works [employer], had worked [employer].         The chief compliant  problem is new to me",   PFSH:  Past Medical History:   Diagnosis Date    Asthma     Fibromyalgia     Gastritis     Insomnia     Sleep apnea          History reviewed. No pertinent surgical history.  Social History     Tobacco Use    Smoking status: Never Smoker   Substance Use Topics    Alcohol use: No    Drug use: Not on file     Family History   Problem Relation Age of Onset    Sleep apnea Mother     Retinal detachment Neg Hx     Macular degeneration Neg Hx     Glaucoma Neg Hx      Review of patient's allergies indicates:   Allergen Reactions    Penicillins Hives       Performance Status:The patient's activity level is functions out of house.      Review of Systems:  a review of eleven systems covering constitutional, Eye, HEENT, Psych, Respiratory, Cardiac, GI, , Musculoskeletal, Endocrine, Dermatologic was negative except for pertinent findings as listed ABOVE and below:   pertinent positive as above, rest is good     Exam:Comprehensive exam done. /71 (BP Location: Right arm, Patient Position: Sitting)   Pulse 89   Ht 5' 9" (1.753 m)   Wt 103 kg (227 lb 1.2 oz)   SpO2 98% Comment: on room air  BMI 33.53 kg/m²  "  Exam included Vitals as listed, and patient's appearance and affect and alertness and mood, oral exam for yeast and hygiene and pharynx lesions and Mallapatti (M) score, neck with inspection for jvd and masses and thyroid abnormalities and lymph nodes (supraclavicular and infraclavicular nodes and axillary also examined and noted if abn), chest exam included symmetry and effort and fremitus and percussion and auscultation, cardiac exam included rhythm and gallops and murmur and rubs and jvd and edema, abdominal exam for mass and hepatosplenomegaly and tenderness and hernias and bowel sounds, Musculoskeletal exam with muscle tone and posture and mobility/gait and  strength, and skin for rashes and cyanosis and pallor and turgor, extremity for clubbing.  Findings were normal except for pertinent findings listed below:  M2, chest is symmetric, no distress, normal percussion, normal fremitus and good normal breath sounds  No clubbing    Radiographs (ct chest and cxr) reviewed: was done    and result was    Done [place] urgent [facility] 10/2018 nad seen on copy on cell phone    Labs reviewed   Done el[place]here and reported near nl.          PFT was not done         Plan:  Clinical impression is resonably certain and repeated evaluation prn +/- follow up will be needed as below.         Follow-up in about 3 months (around 5/4/2019), or if symptoms worsen or fail to improve.    Discussed with patient above for education the following:      Patient Instructions   For excess sleepiness - consider provigil/nuvigil in place ritalin but ritalin may be helping nerves/attention.    Chr sinus and nasal stuffy- flonase and astelin- afrin 10 minutes  before flonase may help    Severe (by need for prednisone 3-4 yearly)  Persistent asthma -    Use azithromycin for any infection     symbicort 2 twice daily - OR  Breo once daily   asmanex 2 daily inhaled steroid     sinugulair      Rescue- albuterol  Action plan- prednisone - call for  early shot if needed.    Immune and allergy check - special rx if needed to be considered.    Add spiriva if gets well covered (only if)

## 2019-02-07 ENCOUNTER — LAB VISIT (OUTPATIENT)
Dept: LAB | Facility: HOSPITAL | Age: 24
End: 2019-02-07
Attending: INTERNAL MEDICINE
Payer: COMMERCIAL

## 2019-02-07 DIAGNOSIS — J45.50 SEVERE PERSISTENT ASTHMA WITHOUT COMPLICATION: ICD-10-CM

## 2019-02-07 LAB
BASOPHILS # BLD AUTO: 0 K/UL
BASOPHILS NFR BLD: 0.6 %
DIFFERENTIAL METHOD: ABNORMAL
EOSINOPHIL # BLD AUTO: 0.1 K/UL
EOSINOPHIL NFR BLD: 1.6 %
ERYTHROCYTE [DISTWIDTH] IN BLOOD BY AUTOMATED COUNT: 12.5 %
HCT VFR BLD AUTO: 45.3 %
HGB BLD-MCNC: 15.4 G/DL
IGE SERPL-ACNC: 660 IU/ML
IGG SERPL-MCNC: 839 MG/DL
IGM SERPL-MCNC: 66 MG/DL
LYMPHOCYTES # BLD AUTO: 1.5 K/UL
LYMPHOCYTES NFR BLD: 23.5 %
MCH RBC QN AUTO: 32.8 PG
MCHC RBC AUTO-ENTMCNC: 34 G/DL
MCV RBC AUTO: 96 FL
MONOCYTES # BLD AUTO: 0.6 K/UL
MONOCYTES NFR BLD: 9.3 %
NEUTROPHILS # BLD AUTO: 4.1 K/UL
NEUTROPHILS NFR BLD: 65 %
PLATELET # BLD AUTO: 182 K/UL
PMV BLD AUTO: 9 FL
RBC # BLD AUTO: 4.7 M/UL
WBC # BLD AUTO: 6.3 K/UL

## 2019-02-07 PROCEDURE — 82785 ASSAY OF IGE: CPT

## 2019-02-07 PROCEDURE — 85025 COMPLETE CBC W/AUTO DIFF WBC: CPT

## 2019-02-07 PROCEDURE — 82784 ASSAY IGA/IGD/IGG/IGM EACH: CPT | Mod: 59

## 2019-02-07 PROCEDURE — 86774 TETANUS ANTIBODY: CPT

## 2019-02-07 PROCEDURE — 36415 COLL VENOUS BLD VENIPUNCTURE: CPT

## 2019-02-07 PROCEDURE — 82784 ASSAY IGA/IGD/IGG/IGM EACH: CPT

## 2019-02-12 LAB
C DIPHTHERIAE AB SER IA-ACNC: 0.1 IU/ML
C TETANI AB SER-ACNC: 0.52 IU/ML
DEPRECATED S PNEUM 1 IGG SER-MCNC: <0.3 MCG/ML
DEPRECATED S PNEUM12 IGG SER-MCNC: <0.3 MCG/ML
DEPRECATED S PNEUM14 IGG SER-MCNC: <0.3 MCG/ML
DEPRECATED S PNEUM19 IGG SER-MCNC: <0.3 MCG/ML
DEPRECATED S PNEUM23 IGG SER-MCNC: <0.3 MCG/ML
DEPRECATED S PNEUM3 IGG SER-MCNC: <0.3 MCG/ML
DEPRECATED S PNEUM4 IGG SER-MCNC: <0.3 MCG/ML
DEPRECATED S PNEUM5 IGG SER-MCNC: 0.8 MCG/ML
DEPRECATED S PNEUM8 IGG SER-MCNC: 0.3 MCG/ML
DEPRECATED S PNEUM9 IGG SER-MCNC: <0.3 MCG/ML
HAEM INFLU B IGG SER-MCNC: 0.15 MG/L
S PNEUM DA 18C IGG SER-MCNC: <0.3 MCG/ML
S PNEUM DA 6B IGG SER-MCNC: <0.3 MCG/ML
S PNEUM DA 7F IGG SER-MCNC: <0.3 MCG/ML
S PNEUM DA 9V IGG SER-MCNC: <0.3 MCG/ML

## 2019-02-13 ENCOUNTER — TELEPHONE (OUTPATIENT)
Dept: PULMONOLOGY | Facility: CLINIC | Age: 24
End: 2019-02-13

## 2019-02-13 NOTE — TELEPHONE ENCOUNTER
Notified pt. Pt verbalized understanding   ----- Message from Junito Hu MD sent at 2/12/2019  1:07 PM CST -----  No immunity to pneumococcus.  Need prevnar 13 and repeat titers in 2 months

## 2019-02-14 ENCOUNTER — PATIENT MESSAGE (OUTPATIENT)
Dept: PULMONOLOGY | Facility: CLINIC | Age: 24
End: 2019-02-14

## 2019-02-14 ENCOUNTER — TELEPHONE (OUTPATIENT)
Dept: PULMONOLOGY | Facility: CLINIC | Age: 24
End: 2019-02-14

## 2019-02-14 NOTE — TELEPHONE ENCOUNTER
Faxed to [facility]   ----- Message from Phani Li sent at 2/14/2019  8:44 AM CST -----  Contact: Apple Taveras  Type: Needs Medical Advice    Who Called:  Patient's mother Aiken  Symptoms (please be specific):    How long has patient had these symptoms:    Pharmacy name and phone #:  [facility] [address] [place] [address]  Best Call Back Number: [phone]  Additional Information: requesting a Rx for pneumonia vaccine

## 2019-02-17 ENCOUNTER — PATIENT MESSAGE (OUTPATIENT)
Dept: PULMONOLOGY | Facility: CLINIC | Age: 24
End: 2019-02-17

## 2019-02-18 ENCOUNTER — PATIENT MESSAGE (OUTPATIENT)
Dept: PULMONOLOGY | Facility: CLINIC | Age: 24
End: 2019-02-18

## 2019-02-18 ENCOUNTER — TELEPHONE (OUTPATIENT)
Dept: PULMONOLOGY | Facility: CLINIC | Age: 24
End: 2019-02-18

## 2019-02-22 DIAGNOSIS — G47.30 HYPERSOMNIA WITH SLEEP APNEA: ICD-10-CM

## 2019-02-22 DIAGNOSIS — G47.10 HYPERSOMNIA WITH SLEEP APNEA: ICD-10-CM

## 2019-02-22 RX ORDER — METHYLPHENIDATE HYDROCHLORIDE 36 MG/1
36 TABLET ORAL EVERY MORNING
Qty: 30 TABLET | Refills: 0 | Status: SHIPPED | OUTPATIENT
Start: 2019-02-22 | End: 2019-03-24

## 2019-02-22 RX ORDER — METHYLPHENIDATE HYDROCHLORIDE 5 MG/1
5 TABLET ORAL
Qty: 90 TABLET | Refills: 0 | Status: SHIPPED | OUTPATIENT
Start: 2019-02-22 | End: 2019-04-01 | Stop reason: SDUPTHER

## 2019-03-08 DIAGNOSIS — D84.9 IMMUNE DEFICIENCY DISORDER: Primary | ICD-10-CM

## 2019-03-11 ENCOUNTER — TELEPHONE (OUTPATIENT)
Dept: PULMONOLOGY | Facility: CLINIC | Age: 24
End: 2019-03-11

## 2019-03-11 DIAGNOSIS — D84.9 IMMUNE DEFICIENCY DISORDER: Primary | ICD-10-CM

## 2019-03-13 ENCOUNTER — CLINICAL SUPPORT (OUTPATIENT)
Dept: INTERNAL MEDICINE | Facility: CLINIC | Age: 24
End: 2019-03-13
Payer: COMMERCIAL

## 2019-03-13 DIAGNOSIS — D84.9 IMMUNE DEFICIENCY DISORDER: ICD-10-CM

## 2019-03-13 PROCEDURE — 90670 PCV13 VACCINE IM: CPT | Mod: S$GLB,,, | Performed by: INTERNAL MEDICINE

## 2019-03-13 PROCEDURE — 99999 PR PBB SHADOW E&M-EST. PATIENT-LVL I: CPT | Mod: PBBFAC,,,

## 2019-03-13 PROCEDURE — 90471 PNEUMOCOCCAL CONJUGATE VACCINE 13-VALENT LESS THAN 5YO & GREATER THAN: ICD-10-PCS | Mod: S$GLB,,, | Performed by: INTERNAL MEDICINE

## 2019-03-13 PROCEDURE — 90471 IMMUNIZATION ADMIN: CPT | Mod: S$GLB,,, | Performed by: INTERNAL MEDICINE

## 2019-03-13 PROCEDURE — 99999 PR PBB SHADOW E&M-EST. PATIENT-LVL I: ICD-10-PCS | Mod: PBBFAC,,,

## 2019-03-13 PROCEDURE — 90670 PNEUMOCOCCAL CONJUGATE VACCINE 13-VALENT LESS THAN 5YO & GREATER THAN: ICD-10-PCS | Mod: S$GLB,,, | Performed by: INTERNAL MEDICINE

## 2019-03-13 NOTE — PROGRESS NOTES
Two person identification name, d.o.b with verbal feedback.  Aseptic technique used.  Administration Prevnar 13 vaccine to the  L Deltoid IM given.  Tolerated well.  waited 15 min.  VIS 11/5/2015 given.

## 2019-03-25 ENCOUNTER — PATIENT MESSAGE (OUTPATIENT)
Dept: SLEEP MEDICINE | Facility: CLINIC | Age: 24
End: 2019-03-25

## 2019-03-30 ENCOUNTER — PATIENT MESSAGE (OUTPATIENT)
Dept: SLEEP MEDICINE | Facility: CLINIC | Age: 24
End: 2019-03-30

## 2019-03-30 DIAGNOSIS — G47.10 HYPERSOMNIA WITH SLEEP APNEA: Primary | ICD-10-CM

## 2019-03-30 DIAGNOSIS — G47.30 HYPERSOMNIA WITH SLEEP APNEA: Primary | ICD-10-CM

## 2019-03-31 RX ORDER — METHYLPHENIDATE HYDROCHLORIDE 36 MG/1
36 TABLET ORAL EVERY MORNING
Qty: 30 TABLET | Refills: 0 | Status: SHIPPED | OUTPATIENT
Start: 2019-03-31 | End: 2019-04-03

## 2019-04-01 DIAGNOSIS — G47.30 HYPERSOMNIA WITH SLEEP APNEA: ICD-10-CM

## 2019-04-01 DIAGNOSIS — G47.10 HYPERSOMNIA WITH SLEEP APNEA: ICD-10-CM

## 2019-04-01 RX ORDER — METHYLPHENIDATE HYDROCHLORIDE 5 MG/1
5 TABLET ORAL
Qty: 90 TABLET | Refills: 0 | Status: SHIPPED | OUTPATIENT
Start: 2019-04-01 | End: 2020-07-29

## 2019-04-01 RX ORDER — METHYLPHENIDATE HYDROCHLORIDE 5 MG/1
5 TABLET ORAL
Qty: 90 TABLET | Refills: 0 | Status: CANCELLED | OUTPATIENT
Start: 2019-04-01

## 2019-04-01 NOTE — TELEPHONE ENCOUNTER
methylphenidate HCl (RITALIN) 5 MG tablet 5 mg, 3 times daily with meals  EditCancel Reorder       Summary: Take 1 tablet (5 mg total) by mouth 3 (three) times daily with meals., Starting Fri 2/22/2019, Normal   Dose, Route, Frequency: 5 mg, Oral, 3 times daily with meals  Start: 2/22/2019  Ord/Sold: 2/22/2019 (O)  Report  Adh:   Long-term:   [facility] Dose [facility]       Patient Sig: Take 1 tablet (5 mg total) by mouth 3 (three) times daily with meals.       Ordered on: 2/22/2019       Authorized by: MARILYN SHARP       Dispense: 90 tablet       Refills: 0 ordered        LAST OFFICE VISIT: 1/16/2019

## 2019-04-03 ENCOUNTER — OFFICE VISIT (OUTPATIENT)
Dept: SLEEP MEDICINE | Facility: CLINIC | Age: 24
End: 2019-04-03
Payer: COMMERCIAL

## 2019-04-03 ENCOUNTER — TELEPHONE (OUTPATIENT)
Dept: SLEEP MEDICINE | Facility: CLINIC | Age: 24
End: 2019-04-03

## 2019-04-03 VITALS
DIASTOLIC BLOOD PRESSURE: 77 MMHG | HEART RATE: 81 BPM | HEIGHT: 69 IN | WEIGHT: 235.44 LBS | SYSTOLIC BLOOD PRESSURE: 112 MMHG | BODY MASS INDEX: 34.87 KG/M2

## 2019-04-03 DIAGNOSIS — G47.10 HYPERSOMNIA WITH SLEEP APNEA: ICD-10-CM

## 2019-04-03 DIAGNOSIS — G47.30 HYPERSOMNIA WITH SLEEP APNEA: ICD-10-CM

## 2019-04-03 DIAGNOSIS — G47.33 OSA (OBSTRUCTIVE SLEEP APNEA): Primary | ICD-10-CM

## 2019-04-03 PROCEDURE — 99214 PR OFFICE/OUTPT VISIT, EST, LEVL IV, 30-39 MIN: ICD-10-PCS | Mod: S$GLB,,, | Performed by: NURSE PRACTITIONER

## 2019-04-03 PROCEDURE — 3008F PR BODY MASS INDEX (BMI) DOCUMENTED: ICD-10-PCS | Mod: CPTII,S$GLB,, | Performed by: NURSE PRACTITIONER

## 2019-04-03 PROCEDURE — 99999 PR PBB SHADOW E&M-EST. PATIENT-LVL IV: ICD-10-PCS | Mod: PBBFAC,,, | Performed by: NURSE PRACTITIONER

## 2019-04-03 PROCEDURE — 99999 PR PBB SHADOW E&M-EST. PATIENT-LVL IV: CPT | Mod: PBBFAC,,, | Performed by: NURSE PRACTITIONER

## 2019-04-03 PROCEDURE — 99214 OFFICE O/P EST MOD 30 MIN: CPT | Mod: S$GLB,,, | Performed by: NURSE PRACTITIONER

## 2019-04-03 PROCEDURE — 3008F BODY MASS INDEX DOCD: CPT | Mod: CPTII,S$GLB,, | Performed by: NURSE PRACTITIONER

## 2019-04-03 RX ORDER — MODAFINIL 200 MG/1
200 TABLET ORAL DAILY
Qty: 30 TABLET | Refills: 0 | Status: SHIPPED | OUTPATIENT
Start: 2019-04-03 | End: 2019-04-26 | Stop reason: SDUPTHER

## 2019-04-03 NOTE — TELEPHONE ENCOUNTER
Author: Tyson Banegas MA Service: -- Author Type: Medical Assistant   Filed: 4/3/2019  2:45 PM Encounter Date: 4/3/2019 Creation Time: 4/3/2019  2:44 PM   Status: Signed : Tyson Banegas MA (Medical Assistant)            []Hide copied text    []Hover for details      Doing PA for pt now

## 2019-04-03 NOTE — TELEPHONE ENCOUNTER
----- Message from Tyson Banegas MA sent at 4/3/2019  2:45 PM CDT -----      ----- Message -----  From: Mikhail Javier NP  Sent: 4/3/2019   9:59 AM  To: Y[facility] Greene Staff    Tyson, please f/u with [facility] regarding PA necessary for Provigil aka modafinil. -sv

## 2019-04-03 NOTE — PROGRESS NOTES
Nghia Chahal seen in follow-up for JAYLEN management and CPAP equipment check.    INTERVAL HISTORY:    2019 HESHAM Javier NP: During last visit, Alvaro NP increased breakthrough Ritalin 5 mg from BID dosing to TID dosing for better control.  Also, pt was having breakthrough snoring with APAP 12-20, Alvaro NP adjusted PAP settings to 14 -16 cm and recommended trial of different FFM. Has been continuing to use Airfit F20 without overt air leaks. Although pt having oral drying.     Pt has not yet been able to get new mask due to an account balance he could not afford mask in January. Called [employer] today with patient in room and according to Marycarmen UNDERWOOD, as long as pt agrees to a payment plan, paying balance in full is not necessary and he may get new type of mask.     Hypersomnia marginally improved with additional breakthrough Ritalin 5 mg dose. Admits that on days he forgets to take Ritalin that sleepiness is worse. However, on days he takes both Concerta CR 36 mg and Ritalin 5 mg TID that he still requires caffeine for better alertness especially when at work at [employer]. In addition, he drinks 6 20 oz bottles of Mt. Dew (total 324 mg of caffeine). He has noticed an increase in his weight because of this ; 14 lb weight gain since last office visit. Reports that he is not getting as much sleep as before, previously averaging 6 hours of sleep, but due to work demands now able to get sleep maximum 5.5 hours at most, many nights per mom who accompanied patient today he sleeps only 4 hours.     CPAP Interrogation: Dreamstation APAP 14 - 16 cm, 2361..6, > 4 hours: , 30-day ave: 5.5 hours, Mask Fit: 100%, Predicted AHI: 0.9, Periodic Breathin%, 90%tile pressure: 15.6 cm     19 VHelio John:   Ritalin 5mg with CR 36mg helps him get through am until 12p then takes 5mg ritalin which helps reduce daytime sleepiness until 5pm. Still sleepy for remainder of shift at work. Gets off 11pm. Denies mood imbalance,  nausea, insomnnia or headaches. Remains adherent with cpap 14cm. Using nightly. Mask cushion and straps worn  Interrogation- AHI 2.1, avg 5.6h/n. F20 mask      12/05/2018 HESHAM Javier NP: Since last clinic visit, pt has completed PSG/titration study followed by MSLT.     JAYLEN - continues to use CPAP nightly with resolution of snoring and interrupted sleep with APAP 12-20 cm. Mother, who accompanied pt today, reports some breakthrough snoring despite properly fitting AirFit F20. Denies oral/nasal drying. Denies overt air leaks. Overall finds CPAP helpful.     CPAP Interrogation: APAP 12-20 cm  Compliance Summary   Days with Device Usage: 29 days Percentage of Days >=4 Hours: 83.3% Average Usage (Days Used): 5 hrs. 54 mins. 9 secs. Average Usage (All Days): 5 hrs. 42 mins. 21 secs  Apnea Indices Average AHI: 1.2 Average OA Index: 0.5 Average CA Index: 0.0   Large Leak Average Time in Large Leak: 12 secs. Average % of Night in Large Leak: 0.1%   Periodic Breathing Average % of Night in PB: 0.3%   90%tile pressure: 15.1 cm    Discussed titration sleep study and MSLT results.   Continues to have significant daytime sleepiness despite Concerta ER 36 mg QAM. Takes 400 mg caffeine tablets plus supplements with soda (Mt. Dew) for energy through out the day. Soda consumption has caused 25 lb weight gain. He especially needs more alertness during working hours, which is usually where he consumes most caffeinated drinks.   Pt understands that since he was unable to discontinue Concerta ER the recommended 7-day period that this may have affected MSLT results.     Insomnia - no longer taking Trazodone. Finds that regular work schedule tires him out more and sleep onset better controlled. Sleep onset latency < 1.5 hours which is acceptable and once asleep able to stay asleep between 5 - 8 hours per sleep tracker.      05/02/2018 HESHAM Javier NP: Pt returns after set up of PAP machine on 03/23/2018 at [facility]. Pt sleep complaints of snoring,  "and poor sleep quality now resolved with PAP use. Reports that nocturia of up to 6 x per night resolved, "I don't wake up anymore". Also has weaned off Trazodone 300 mg, now only taking 50 - 100 mg dosing. He has also stopped taking clonidine. Goal is to stop taking sleep meds altogether. However despite averaging nearly 8 hours of sleep with CPAP nightly and taking Concerta 36 mg daily, he continues to have excessive daytime sleepiness. Reports that he finds himself dozing off while driving and he struggles to stay awake while at work. He finds daytime sleepiness disabling.      Denies oral/nasal drying with Airfit F20 mask. Initially got size small mask which caused skin irritation; pt's mother had an extra mask size large which he uses that fits better. Denies mask leaks. Denies rainout.  Denies pressure intolerance or air hunger. Denies aerophagia. Pt's machine originally prescribed 4 - 20 cm but he had air hunger and self-adjusted machine settings to 12 - 20 cm.     Since starting CPAP, chronic nasal congestion has worsened despite taking Flonase, Astelin, and Singulair. Pt also using heated tubing with CPAP.     EPWORTH SLEEPINESS SCALE 3/13/2018   Sitting and reading 0   Watching TV 0   Sitting, inactive in a public place (e.g. a theatre or a meeting)s 1   As a passenger in a car for an hour without a break 3   Lying down to rest in the afternoon when circumstances permit 0   Sitting and talking to someone 0   Sitting quietly after a lunch without alcohol 0   In a car, while stopped for a few minutes in traffic 3   Total score 7       CPAP Interrogation: APAP 12-20 cm  Compliance Summary Days with Device Usage: 30 days Percentage of Days >=4 Hours: 100.0% Average Usage (Days Used): 7 hrs. 57 mins. 52 secs. Average Usage (All Days): 7 hrs. 57 mins. 52 secs  Apnea Indices Average AHI: 1.3 Average OA Index: 0.4 Average CA Index: 0.2   Large Leak Average Time in Large Leak: 3 mins. 10 secs. Average % of Night in " "Large Leak: 0.7%   Periodic Breathing Average % of Night in PB: 0.2%  90%tile pressure: 15.8 cm      03/14/2018 HESHAM Javier NP: Of note, in -lab PSG denied by insurance; HST ordered instead. Discussed 02/21/2018 home sleep study results in detail. Pt complaints of snoring, poor sleep quality, and excessive daytime sleepiness remain the same.     Continues to take Concentra 36 mg daily for ADHD and Trazodone 300 mg w/ Clonidine 0.1 mg for insomnia. Both managed by PCP.     EPWORTH SLEEPINESS SCALE 3/13/2018   Sitting and reading 0   Watching TV 0   Sitting, inactive in a public place (e.g. a theatre or a meeting) 1   As a passenger in a car for an hour without a break 3   Lying down to rest in the afternoon when circumstances permit 0   Sitting and talking to someone 0   Sitting quietly after a lunch without alcohol 0   In a car, while stopped for a few minutes in traffic 3   Total score 7          01/24/2018 Dr. Michaels This 23 y.o. male patient presents for the evaluation of possible obstructive sleep apnea.    Patient not sleeping well since age 13.  Has tried numerous medications, but not working too well. Variable response.  Can feel hung over the next day    Takes 0.1 mg clonidine at bedtime and 300 mg trazodone at bedtime  Tried melatonin in the past.    He reports difficulty initiating sleep at night.  Not sleepy at bedtime, like morning "just struck".  Actually doesn't feel sleepy until around 6 am, but then still lies awake    He tried eliminating electronics from bedroom, darkening shades    In past, tried Concerta 36 mg for ADHD (tired, memory fog, daytime concentration difficulties) still taking    ESS = 1    Snoring when sleeping  Restless in sleep, toss and turn    Denies restless legs feeling      SLEEP ROUTINE:   Bed partner: own room   Time to bed: varies 10-11 pm   Sleep onset latency: prolonged   Disruptions or awakenings: none   Wakeup time: 6:30 am (work) if not having to work, stays in bed until " about 10 am   Perceived sleep quality: poor   Daytime naps: none    Titration followed by MSLT 09/24/2018 189 lb. Effective control of sleep disordered breathing was seen in supine REM sleep on 14 cm H2O.   MSLT: Next day, for the MSLT 5 naps were recorded at 2 hour intervals, for approximately 20 minutes duration each, starting at a lights out time of 7: 15 AM for Nap 1. He fell asleep on 5/5 naps and developed sleep onset REM periods (SOREMPs) on 0/5 naps. The sleep onset latency for Naps 1 through 5 were 1:30 min, 00:30 min, 04:30 min, 05:00 min and 08:00 min, respectively. The 5 nap-mean sleep latency was severily diminished at 03:54 minutes. The patient felt that he fell asleep on naps. Urine drug screen on the morning of the MSLT was presumptive positive for amphetamine.   Baseline Sleep Study: 02/21/2018  lb. The overall AHI was 14 and overall RDI was 41. The oxygen yas was 87.5 % and % time < 90% SpO2 was 0.1 %.      Past Medical History:   Diagnosis Date    Asthma     Fibromyalgia     Gastritis     Insomnia     Sleep apnea        No past surgical history on file.    Family History   Problem Relation Age of Onset    Sleep apnea Mother     Retinal detachment Neg Hx     Macular degeneration Neg Hx     Glaucoma Neg Hx        Social History     Tobacco Use    Smoking status: Never Smoker   Substance Use Topics    Alcohol use: No    Drug use: Not on file       ALLERGIES: Reviewed in EPIC    CURRENT MEDICATIONS: Reviewed in EPIC    REVIEW OF SYSTEMS:  Sleep related symptoms as per HPI;    Denies weight gain;    Sometimes sinus problems;    Denies dyspnea;    Denies palpitations;    Denies acid reflux;    Denies polyuria;    sometimes body aches   Denies headaches;    sometimes mood disturbance;    Denies anemia;    Otherwise, a balance of systems reviewed is negative.    PHYSICAL EXAM:  There were no vitals taken for this visit.  GENERAL: Well groomed; Normally  developed;    ASSESSMENT:    Obstructive sleep apnea, mild by AHI, severe by RDI. The patient symptomatically has snoring, and poor sleep quality resolved with CPAP use. Continues to have some breakthrough snoring with APAP 14 - 16 cm. The patient is adherent on CPAP and experiencing symptomatic benefit. Medical co-morbidities: overweight BMI.     Persistent hypersomnia, despite JAYLEN being effectively treated and daily Concerta CR 36 mg and Ritalin 5 mg TID     Insomnia NEC, resolved, has self-weaned Trazodone completely. More active during the day with regular work schedule which has helped with insomnia.     Chronic nasal congestion, adequately managed, continues to use Flonase, Astelin, heated tubing, and Singulair; consulted ENT, ultimately allergy testing recommended since pt prefers conservative/nonsurgical options    PLAN:    Treatment:    Continue APAP 14 -16 cm. Go to [employer] to get replacement mask. Optimize mask fit, which should ameliorate snoring. Trial chinstrap in addition to FFM prn. Settings of machine effective as evidence by predicted AHI 0.9.     Hypersomnia:   -stop Concertan CR 36 mg. Try Provigil 200 mg qam. Continue Ritalin 5 mg TID prn.   -Explicitly recommended increasing opportunity for sleep. Increase mask-on time     RTC 3 - 4 months    Education: During our discussion today, we talked about the etiology of obstructive sleep apnea as well as the potential ramifications of untreated sleep apnea, which could include daytime sleepiness, hypertension, heart disease and/or stroke. We discussed potential treatment options, which could include weight loss, body positioning, continuous positive airway pressure (CPAP), OA, EPAP, or referral for surgical consideration.    Precautions: The patient was advised to abstain from driving should they feel sleepy  or drowsy.

## 2019-04-04 ENCOUNTER — TELEPHONE (OUTPATIENT)
Dept: SLEEP MEDICINE | Facility: CLINIC | Age: 24
End: 2019-04-04

## 2019-04-04 NOTE — TELEPHONE ENCOUNTER
Called pt to let him know that we haven't received anything from his ins. And as far as his pa I'm submitting that in now.

## 2019-04-04 NOTE — TELEPHONE ENCOUNTER
----- Message from Lillie Melchor sent at 4/4/2019 12:41 PM CDT -----  Contact: Pt   Name of Who is Calling: KVNG SPRINGER [[id number]]    What is the request in detail: Pt called to advise that his insurance company will be sending forms to be complete and a PA need to be submitted for his CPAP machine. Please call pt to further discuss and advise.     Can the clinic reply by [facility]: No     What Number to Call Back if not in [facility]: [phone]

## 2019-04-26 ENCOUNTER — PATIENT MESSAGE (OUTPATIENT)
Dept: SLEEP MEDICINE | Facility: CLINIC | Age: 24
End: 2019-04-26

## 2019-04-26 DIAGNOSIS — G47.30 HYPERSOMNIA WITH SLEEP APNEA: ICD-10-CM

## 2019-04-26 DIAGNOSIS — G47.10 HYPERSOMNIA WITH SLEEP APNEA: ICD-10-CM

## 2019-04-26 RX ORDER — MODAFINIL 200 MG/1
200 TABLET ORAL DAILY
Qty: 30 TABLET | Refills: 0 | Status: SHIPPED | OUTPATIENT
Start: 2019-04-26 | End: 2019-05-27 | Stop reason: SDUPTHER

## 2019-05-27 DIAGNOSIS — G47.10 HYPERSOMNIA WITH SLEEP APNEA: ICD-10-CM

## 2019-05-27 DIAGNOSIS — G47.30 HYPERSOMNIA WITH SLEEP APNEA: ICD-10-CM

## 2019-05-27 RX ORDER — MODAFINIL 200 MG/1
200 TABLET ORAL DAILY
Qty: 30 TABLET | Refills: 0 | Status: SHIPPED | OUTPATIENT
Start: 2019-05-27 | End: 2019-06-03 | Stop reason: SDUPTHER

## 2019-06-03 DIAGNOSIS — G47.30 HYPERSOMNIA WITH SLEEP APNEA: ICD-10-CM

## 2019-06-03 DIAGNOSIS — G47.10 HYPERSOMNIA WITH SLEEP APNEA: ICD-10-CM

## 2019-06-03 RX ORDER — MODAFINIL 200 MG/1
200 TABLET ORAL DAILY
Qty: 30 TABLET | Refills: 0 | Status: SHIPPED | OUTPATIENT
Start: 2019-06-03 | End: 2019-06-27 | Stop reason: SDUPTHER

## 2019-06-27 DIAGNOSIS — G47.10 HYPERSOMNIA WITH SLEEP APNEA: ICD-10-CM

## 2019-06-27 DIAGNOSIS — G47.30 HYPERSOMNIA WITH SLEEP APNEA: ICD-10-CM

## 2019-06-28 RX ORDER — MODAFINIL 200 MG/1
200 TABLET ORAL DAILY
Qty: 30 TABLET | Refills: 0 | Status: SHIPPED | OUTPATIENT
Start: 2019-06-28 | End: 2019-07-28

## 2019-07-31 ENCOUNTER — PATIENT MESSAGE (OUTPATIENT)
Dept: SLEEP MEDICINE | Facility: CLINIC | Age: 24
End: 2019-07-31

## 2019-07-31 DIAGNOSIS — G47.30 HYPERSOMNIA WITH SLEEP APNEA: Primary | ICD-10-CM

## 2019-07-31 DIAGNOSIS — G47.10 HYPERSOMNIA WITH SLEEP APNEA: Primary | ICD-10-CM

## 2019-07-31 RX ORDER — MODAFINIL 200 MG/1
200 TABLET ORAL DAILY
Qty: 30 TABLET | Refills: 0 | Status: SHIPPED | OUTPATIENT
Start: 2019-07-31 | End: 2019-08-27 | Stop reason: SDUPTHER

## 2019-08-02 ENCOUNTER — PATIENT MESSAGE (OUTPATIENT)
Dept: SLEEP MEDICINE | Facility: CLINIC | Age: 24
End: 2019-08-02

## 2019-08-02 ENCOUNTER — TELEPHONE (OUTPATIENT)
Dept: SLEEP MEDICINE | Facility: CLINIC | Age: 24
End: 2019-08-02

## 2019-08-05 ENCOUNTER — TELEPHONE (OUTPATIENT)
Dept: SLEEP MEDICINE | Facility: CLINIC | Age: 24
End: 2019-08-05

## 2019-08-27 ENCOUNTER — TELEPHONE (OUTPATIENT)
Dept: SLEEP MEDICINE | Facility: CLINIC | Age: 24
End: 2019-08-27

## 2019-08-27 DIAGNOSIS — G47.10 HYPERSOMNIA WITH SLEEP APNEA: ICD-10-CM

## 2019-08-27 DIAGNOSIS — G47.30 HYPERSOMNIA WITH SLEEP APNEA: ICD-10-CM

## 2019-08-27 RX ORDER — MODAFINIL 200 MG/1
200 TABLET ORAL DAILY
Qty: 30 TABLET | Refills: 0 | Status: SHIPPED | OUTPATIENT
Start: 2019-08-27 | End: 2019-09-26

## 2019-09-30 DIAGNOSIS — G47.19 EXCESSIVE DAYTIME SLEEPINESS: ICD-10-CM

## 2019-09-30 DIAGNOSIS — G47.30 HYPERSOMNIA WITH SLEEP APNEA: Primary | ICD-10-CM

## 2019-09-30 DIAGNOSIS — G47.10 HYPERSOMNIA WITH SLEEP APNEA: Primary | ICD-10-CM

## 2019-09-30 RX ORDER — MODAFINIL 200 MG/1
200 TABLET ORAL DAILY
COMMUNITY
End: 2019-09-30 | Stop reason: SDUPTHER

## 2019-10-07 RX ORDER — MODAFINIL 200 MG/1
TABLET ORAL
Qty: 30 TABLET | Refills: 5 | Status: SHIPPED | OUTPATIENT
Start: 2019-10-07 | End: 2019-12-27 | Stop reason: SDUPTHER

## 2019-12-27 ENCOUNTER — PATIENT MESSAGE (OUTPATIENT)
Dept: SLEEP MEDICINE | Facility: CLINIC | Age: 24
End: 2019-12-27

## 2019-12-27 DIAGNOSIS — G47.19 EXCESSIVE DAYTIME SLEEPINESS: ICD-10-CM

## 2019-12-27 DIAGNOSIS — G47.10 HYPERSOMNIA WITH SLEEP APNEA: ICD-10-CM

## 2019-12-27 DIAGNOSIS — G47.30 HYPERSOMNIA WITH SLEEP APNEA: ICD-10-CM

## 2019-12-27 RX ORDER — MODAFINIL 200 MG/1
TABLET ORAL
Qty: 30 TABLET | Refills: 5 | Status: SHIPPED | OUTPATIENT
Start: 2019-12-27 | End: 2020-06-27 | Stop reason: SDUPTHER

## 2019-12-31 ENCOUNTER — PATIENT MESSAGE (OUTPATIENT)
Dept: SLEEP MEDICINE | Facility: CLINIC | Age: 24
End: 2019-12-31

## 2019-12-31 ENCOUNTER — TELEPHONE (OUTPATIENT)
Dept: SLEEP MEDICINE | Facility: CLINIC | Age: 24
End: 2019-12-31

## 2019-12-31 NOTE — TELEPHONE ENCOUNTER
PA was completed on 12/31/19 at 10:26 AM. Previous PA is still active with an expiration date of 04/03/2020. I wont be able to do another PA until 60 days after expatriation date.

## 2020-01-30 ENCOUNTER — TELEPHONE (OUTPATIENT)
Dept: SLEEP MEDICINE | Facility: CLINIC | Age: 25
End: 2020-01-30

## 2020-06-27 DIAGNOSIS — G47.19 EXCESSIVE DAYTIME SLEEPINESS: ICD-10-CM

## 2020-06-27 DIAGNOSIS — G47.10 HYPERSOMNIA WITH SLEEP APNEA: ICD-10-CM

## 2020-06-27 DIAGNOSIS — G47.30 HYPERSOMNIA WITH SLEEP APNEA: ICD-10-CM

## 2020-06-28 RX ORDER — MODAFINIL 200 MG/1
TABLET ORAL
Qty: 30 TABLET | Refills: 0 | Status: SHIPPED | OUTPATIENT
Start: 2020-06-28 | End: 2020-07-29 | Stop reason: SDUPTHER

## 2020-07-23 ENCOUNTER — TELEPHONE (OUTPATIENT)
Dept: SLEEP MEDICINE | Facility: CLINIC | Age: 25
End: 2020-07-23

## 2020-07-29 ENCOUNTER — OFFICE VISIT (OUTPATIENT)
Dept: SLEEP MEDICINE | Facility: CLINIC | Age: 25
End: 2020-07-29
Payer: MEDICAID

## 2020-07-29 DIAGNOSIS — G47.10 HYPERSOMNIA WITH SLEEP APNEA: ICD-10-CM

## 2020-07-29 DIAGNOSIS — G47.19 EXCESSIVE DAYTIME SLEEPINESS: ICD-10-CM

## 2020-07-29 DIAGNOSIS — G47.33 OBSTRUCTIVE SLEEP APNEA: Primary | ICD-10-CM

## 2020-07-29 DIAGNOSIS — G47.30 HYPERSOMNIA WITH SLEEP APNEA: ICD-10-CM

## 2020-07-29 PROCEDURE — 99213 OFFICE O/P EST LOW 20 MIN: CPT | Mod: 95,,, | Performed by: NURSE PRACTITIONER

## 2020-07-29 PROCEDURE — 99213 PR OFFICE/OUTPT VISIT, EST, LEVL III, 20-29 MIN: ICD-10-PCS | Mod: 95,,, | Performed by: NURSE PRACTITIONER

## 2020-07-29 RX ORDER — MODAFINIL 200 MG/1
TABLET ORAL
Qty: 30 TABLET | Refills: 5 | Status: SHIPPED | OUTPATIENT
Start: 2020-07-29 | End: 2021-01-24

## 2020-07-29 NOTE — PROGRESS NOTES
The patient location is: home   The chief complaint leading to consultation is: annual f/u JAYLEN    Visit type: audiovisual    Face to Face time with patient: 16 minutes of total time spent on the encounter, which includes face to face time and non-face to face time preparing to see the patient (eg, review of tests), Obtaining and/or reviewing separately obtained history, Documenting clinical information in the electronic or other health record, Independently interpreting results (not separately reported) and communicating results to the patient/family/caregiver, or Care coordination (not separately reported).     Each patient to whom he or she provides medical services by telemedicine is:  (1) informed of the relationship between the physician and patient and the respective role of any other health care provider with respect to management of the patient; and (2) notified that he or she may decline to receive medical services by telemedicine and may withdraw from such care at any time.    Nghia Chahal 24 y.o. year-old male returns for management of obstructive sleep apnea and CPAP equipment check.     INTERVAL HISTORY:    07/29/2020 HESHAM Javier NP: Annual visit delayed due to job loss. Although recently started new job at "[employer]. Reports better hours, no longer doing doubles like he did at [employer]. Busy with house renovations. Getting only about 6 or less hours of sleep. Reports when able to sleep longer that APAP helps, as well as Provigil in improving daytime alertness. Denies untoward side effects from Provigil like headaches, dizziness, nervousness, anxiety, agitation, or insomnia. CPAP use dropped some due to needing supplies, but have resumed regular nightly use despite old mask since it improves sleep. He now has Medicaid and can get supplies. He still would like to try different FFM. Of note, up to date data on [employer] not available - modem likely needs to be checked. Pt did not have machine during  telemed visit to do manual upload.     Last Upload available: 2019 - 2019 APAP 14 - 16 cm, 28 days used, > 4 hours: 83.3%, Predicted AHI: 0.7, 90%tile pressure: 15.3 cm     2019 HESHAM Javier NP: During last visit, Alvaro NP increased breakthrough Ritalin 5 mg from BID dosing to TID dosing for better control.  Also, pt was having breakthrough snoring with APAP 12-20, Alvaro NP adjusted PAP settings to 14 -16 cm and recommended trial of different FFM. Has been continuing to use Airfit F20 without overt air leaks. Although pt having oral drying.      Pt has not yet been able to get new mask due to an account balance he could not afford mask in January. Called [employer] today with patient in room and according to Marycarmen UNDERWOOD, as long as pt agrees to a payment plan, paying balance in full is not necessary and he may get new type of mask.      Hypersomnia marginally improved with additional breakthrough Ritalin 5 mg dose. Admits that on days he forgets to take Ritalin that sleepiness is worse. However, on days he takes both Concerta CR 36 mg and Ritalin 5 mg TID that he still requires caffeine for better alertness especially when at work at [employer]. In addition, he drinks 6 20 oz bottles of Mt. Dew (total 324 mg of caffeine). He has noticed an increase in his weight because of this ; 14 lb weight gain since last office visit. Reports that he is not getting as much sleep as before, previously averaging 6 hours of sleep, but due to work demands now able to get sleep maximum 5.5 hours at most, many nights per mom who accompanied patient today he sleeps only 4 hours.      CPAP Interrogation: Dreamstation APAP 14 - 16 cm, 2361..6, > 4 hours: , 30-day ave: 5.5 hours, Mask Fit: 100%, Predicted AHI: 0.9, Periodic Breathin%, 90%tile pressure: 15.6 cm      19 VHelio John:   Ritalin 5mg with CR 36mg helps him get through am until 12p then takes 5mg ritalin which helps reduce daytime sleepiness until 5pm.  Still sleepy for remainder of shift at work. Gets off 11pm. Denies mood imbalance, nausea, insomnnia or headaches. Remains adherent with cpap 14cm. Using nightly. Mask cushion and straps worn  Interrogation- AHI 2.1, avg 5.6h/n. F20 mask        12/05/2018 HESHAM Javier NP: Since last clinic visit, pt has completed PSG/titration study followed by MSLT.      JAYLEN - continues to use CPAP nightly with resolution of snoring and interrupted sleep with APAP 12-20 cm. Mother, who accompanied pt today, reports some breakthrough snoring despite properly fitting AirFit F20. Denies oral/nasal drying. Denies overt air leaks. Overall finds CPAP helpful.      CPAP Interrogation: APAP 12-20 cm  Compliance Summary   Days with Device Usage: 29 days Percentage of Days >=4 Hours: 83.3% Average Usage (Days Used): 5 hrs. 54 mins. 9 secs. Average Usage (All Days): 5 hrs. 42 mins. 21 secs  Apnea Indices Average AHI: 1.2 Average OA Index: 0.5 Average CA Index: 0.0   Large Leak Average Time in Large Leak: 12 secs. Average % of Night in Large Leak: 0.1%   Periodic Breathing Average % of Night in PB: 0.3%   90%tile pressure: 15.1 cm     Discussed titration sleep study and MSLT results.   Continues to have significant daytime sleepiness despite Concerta ER 36 mg QAM. Takes 400 mg caffeine tablets plus supplements with soda (Mt. Dew) for energy through out the day. Soda consumption has caused 25 lb weight gain. He especially needs more alertness during working hours, which is usually where he consumes most caffeinated drinks.   Pt understands that since he was unable to discontinue Concerta ER the recommended 7-day period that this may have affected MSLT results.      Insomnia - no longer taking Trazodone. Finds that regular work schedule tires him out more and sleep onset better controlled. Sleep onset latency < 1.5 hours which is acceptable and once asleep able to stay asleep between 5 - 8 hours per sleep tracker.       05/02/2018 HESHAM Javier NP: Pt  "returns after set up of PAP machine on 03/23/2018 at [facility]. Pt sleep complaints of snoring, and poor sleep quality now resolved with PAP use. Reports that nocturia of up to 6 x per night resolved, "I don't wake up anymore". Also has weaned off Trazodone 300 mg, now only taking 50 - 100 mg dosing. He has also stopped taking clonidine. Goal is to stop taking sleep meds altogether. However despite averaging nearly 8 hours of sleep with CPAP nightly and taking Concerta 36 mg daily, he continues to have excessive daytime sleepiness. Reports that he finds himself dozing off while driving and he struggles to stay awake while at work. He finds daytime sleepiness disabling.       Denies oral/nasal drying with Airfit F20 mask. Initially got size small mask which caused skin irritation; pt's mother had an extra mask size large which he uses that fits better. Denies mask leaks. Denies rainout.  Denies pressure intolerance or air hunger. Denies aerophagia. Pt's machine originally prescribed 4 - 20 cm but he had air hunger and self-adjusted machine settings to 12 - 20 cm.      Since starting CPAP, chronic nasal congestion has worsened despite taking Flonase, Astelin, and Singulair. Pt also using heated tubing with CPAP.      EPWORTH SLEEPINESS SCALE 3/13/2018   Sitting and reading 0   Watching TV 0   Sitting, inactive in a public place (e.g. a theatre or a meeting)s 1   As a passenger in a car for an hour without a break 3   Lying down to rest in the afternoon when circumstances permit 0   Sitting and talking to someone 0   Sitting quietly after a lunch without alcohol 0   In a car, while stopped for a few minutes in traffic 3   Total score 7         CPAP Interrogation: APAP 12-20 cm  Compliance Summary Days with Device Usage: 30 days Percentage of Days >=4 Hours: 100.0% Average Usage (Days Used): 7 hrs. 57 mins. 52 secs. Average Usage (All Days): 7 hrs. 57 mins. 52 secs  Apnea Indices Average AHI: 1.3 Average OA Index: 0.4 " "Average CA Index: 0.2   Large Leak Average Time in Large Leak: 3 mins. 10 secs. Average % of Night in Large Leak: 0.7%   Periodic Breathing Average % of Night in PB: 0.2%  90%tile pressure: 15.8 cm       03/14/2018 HESHAM Javier NP: Of note, in -lab PSG denied by insurance; HST ordered instead. Discussed 02/21/2018 home sleep study results in detail. Pt complaints of snoring, poor sleep quality, and excessive daytime sleepiness remain the same.      Continues to take Concentra 36 mg daily for ADHD and Trazodone 300 mg w/ Clonidine 0.1 mg for insomnia. Both managed by PCP.      EPWORTH SLEEPINESS SCALE 3/13/2018   Sitting and reading 0   Watching TV 0   Sitting, inactive in a public place (e.g. a theatre or a meeting) 1   As a passenger in a car for an hour without a break 3   Lying down to rest in the afternoon when circumstances permit 0   Sitting and talking to someone 0   Sitting quietly after a lunch without alcohol 0   In a car, while stopped for a few minutes in traffic 3   Total score 7            01/24/2018 Dr. Michaels This 23 y.o. male patient presents for the evaluation of possible obstructive sleep apnea.     Patient not sleeping well since age 13.  Has tried numerous medications, but not working too well. Variable response.  Can feel hung over the next day     Takes 0.1 mg clonidine at bedtime and 300 mg trazodone at bedtime  Tried melatonin in the past.     He reports difficulty initiating sleep at night.  Not sleepy at bedtime, like morning "just struck".  Actually doesn't feel sleepy until around 6 am, but then still lies awake     He tried eliminating electronics from bedroom, darkening shades     In past, tried Concerta 36 mg for ADHD (tired, memory fog, daytime concentration difficulties) still taking     ESS = 1     Snoring when sleeping  Restless in sleep, toss and turn     Denies restless legs feeling       Titration followed by MSLT 09/24/2018 189 lb. Effective control of sleep disordered breathing " was seen in supine REM sleep on 14 cm H2O.   MSLT: Next day, for the MSLT 5 naps were recorded at 2 hour intervals, for approximately 20 minutes duration each, starting at a lights out time of 7: 15 AM for Nap 1. He fell asleep on 5/5 naps and developed sleep onset REM periods (SOREMPs) on 0/5 naps. The sleep onset latency for Naps 1 through 5 were 1:30 min, 00:30 min, 04:30 min, 05:00 min and 08:00 min, respectively. The 5 nap-mean sleep latency was severily diminished at 03:54 minutes. The patient felt that he fell asleep on naps. Urine drug screen on the morning of the MSLT was presumptive positive for amphetamine.   Baseline Sleep Study: 02/21/2018  lb. The overall AHI was 14 and overall RDI was 41. The oxygen yas was 87.5 % and % time < 90% SpO2 was 0.1 %.      Review of Systems: Reports weight today is 219 lb. Sleep related symptoms as per HPI. Otherwise, a balance of 10 systems reviewed is negative.    Assessment:     Obstructive sleep apnea, mild by AHI, severe by RDI. The patient symptomatically has snoring, and poor sleep quality which improves with CPAP use.  The patient is subjectively adherent on CPAP and experiencing symptomatic benefit. Medical co-morbidities: overweight BMI.      Persistent hypersomnia, despite APAP use, managed with Provigil 200 mg daily; reports some residual sleepiness when only able to get < 6 hours of sleep      Insomnia NEC, resolved, has self-weaned Trazodone completely. More active during the day with regular work schedule which has helped with insomnia.      Chronic nasal congestion, adequately managed, continues to use Flonase, Astelin, heated tubing, and Singulair; consulted ENT, ultimately allergy testing recommended since pt prefers conservative/nonsurgical options    Plan:     Treatment:     Continue APAP 14 -16 cm. Go to [facility] @ [place] to get replacement mask and have modem checked.     Hypersomnia:   -Continue Provigil 200 mg qam.   -Explicitly recommended  increasing opportunity for sleep to minimum 7 hours with CPAP. Increase mask-on time     Precautions: The patient was advised to abstain from driving should they feel sleepy or drowsy

## 2020-08-18 ENCOUNTER — TELEPHONE (OUTPATIENT)
Dept: SLEEP MEDICINE | Facility: CLINIC | Age: 25
End: 2020-08-18

## 2020-08-18 NOTE — TELEPHONE ENCOUNTER
----- Message from Mikhail Javier NP sent at 7/29/2020  9:34 AM CDT -----  Has he taken machine to have modem checked at [facility]?

## 2020-08-18 NOTE — TELEPHONE ENCOUNTER
PAP machine En[employer] data up to date.     DATE RANGE: 7/18/2020 - 8/16/2020  APAP 14-16 cm  Compliance Summary  Days with Device Usage: 24 days  Percentage of Days >=4 Hours: 73.3%  Average Usage (Days Used): 6 hrs. 25 secs.  Average Usage (All Days): 4 hrs. 48 mins. 20 secs.    Apnea Indices  Average AHI: 0.7  Average OA Index: 0.2  Average CA Index: 0.1    Large Leak  Average Time in Large Leak: 2 mins.  Average % of Night in Large Leak: 0.6%

## 2020-09-17 ENCOUNTER — PATIENT MESSAGE (OUTPATIENT)
Dept: SLEEP MEDICINE | Facility: CLINIC | Age: 25
End: 2020-09-17

## 2020-09-23 ENCOUNTER — PATIENT MESSAGE (OUTPATIENT)
Dept: SLEEP MEDICINE | Facility: CLINIC | Age: 25
End: 2020-09-23

## 2020-09-27 ENCOUNTER — PATIENT MESSAGE (OUTPATIENT)
Dept: SLEEP MEDICINE | Facility: CLINIC | Age: 25
End: 2020-09-27

## 2021-01-22 DIAGNOSIS — G47.10 HYPERSOMNIA WITH SLEEP APNEA: ICD-10-CM

## 2021-01-22 DIAGNOSIS — G47.30 HYPERSOMNIA WITH SLEEP APNEA: ICD-10-CM

## 2021-01-22 DIAGNOSIS — G47.19 EXCESSIVE DAYTIME SLEEPINESS: ICD-10-CM

## 2021-01-24 RX ORDER — MODAFINIL 200 MG/1
TABLET ORAL
Qty: 30 TABLET | Refills: 5 | Status: SHIPPED | OUTPATIENT
Start: 2021-01-24 | End: 2021-05-28 | Stop reason: SDUPTHER

## 2021-04-29 ENCOUNTER — PATIENT MESSAGE (OUTPATIENT)
Dept: RESEARCH | Facility: HOSPITAL | Age: 26
End: 2021-04-29

## 2021-05-25 ENCOUNTER — PATIENT MESSAGE (OUTPATIENT)
Dept: SLEEP MEDICINE | Facility: CLINIC | Age: 26
End: 2021-05-25

## 2021-05-26 ENCOUNTER — TELEPHONE (OUTPATIENT)
Dept: SLEEP MEDICINE | Facility: CLINIC | Age: 26
End: 2021-05-26

## 2021-05-27 ENCOUNTER — PATIENT MESSAGE (OUTPATIENT)
Dept: PAIN MEDICINE | Facility: CLINIC | Age: 26
End: 2021-05-27

## 2021-05-27 ENCOUNTER — TELEPHONE (OUTPATIENT)
Dept: SLEEP MEDICINE | Facility: CLINIC | Age: 26
End: 2021-05-27

## 2021-05-28 ENCOUNTER — TELEPHONE (OUTPATIENT)
Dept: SLEEP MEDICINE | Facility: CLINIC | Age: 26
End: 2021-05-28

## 2021-05-28 ENCOUNTER — OFFICE VISIT (OUTPATIENT)
Dept: SLEEP MEDICINE | Facility: CLINIC | Age: 26
End: 2021-05-28
Payer: MEDICAID

## 2021-05-28 DIAGNOSIS — G47.33 OBSTRUCTIVE SLEEP APNEA: ICD-10-CM

## 2021-05-28 DIAGNOSIS — G47.26 SHIFT WORK SLEEP DISORDER: ICD-10-CM

## 2021-05-28 DIAGNOSIS — G47.10 HYPERSOMNIA WITH SLEEP APNEA: Primary | ICD-10-CM

## 2021-05-28 DIAGNOSIS — G47.30 HYPERSOMNIA WITH SLEEP APNEA: Primary | ICD-10-CM

## 2021-05-28 DIAGNOSIS — G47.19 EXCESSIVE DAYTIME SLEEPINESS: ICD-10-CM

## 2021-05-28 PROCEDURE — 99213 PR OFFICE/OUTPT VISIT, EST, LEVL III, 20-29 MIN: ICD-10-PCS | Mod: 95,,, | Performed by: INTERNAL MEDICINE

## 2021-05-28 PROCEDURE — 99213 OFFICE O/P EST LOW 20 MIN: CPT | Mod: 95,,, | Performed by: INTERNAL MEDICINE

## 2021-05-28 RX ORDER — MODAFINIL 200 MG/1
TABLET ORAL
Qty: 30 TABLET | Refills: 5 | Status: SHIPPED | OUTPATIENT
Start: 2021-05-28 | End: 2021-11-15 | Stop reason: SDUPTHER

## 2021-11-15 DIAGNOSIS — G47.19 EXCESSIVE DAYTIME SLEEPINESS: ICD-10-CM

## 2021-11-15 DIAGNOSIS — G47.10 HYPERSOMNIA WITH SLEEP APNEA: ICD-10-CM

## 2021-11-15 DIAGNOSIS — G47.30 HYPERSOMNIA WITH SLEEP APNEA: ICD-10-CM

## 2021-11-15 RX ORDER — MODAFINIL 200 MG/1
TABLET ORAL
Qty: 30 TABLET | Refills: 5 | Status: SHIPPED | OUTPATIENT
Start: 2021-11-15 | End: 2022-05-12 | Stop reason: SDUPTHER

## 2021-11-17 ENCOUNTER — TELEPHONE (OUTPATIENT)
Dept: SLEEP MEDICINE | Facility: CLINIC | Age: 26
End: 2021-11-17
Payer: MEDICAID

## 2021-11-17 ENCOUNTER — PATIENT MESSAGE (OUTPATIENT)
Dept: SLEEP MEDICINE | Facility: CLINIC | Age: 26
End: 2021-11-17
Payer: MEDICAID

## 2022-11-08 ENCOUNTER — TELEPHONE (OUTPATIENT)
Dept: PULMONOLOGY | Facility: CLINIC | Age: 27
End: 2022-11-08
Payer: MEDICAID

## 2022-11-08 NOTE — TELEPHONE ENCOUNTER
----- Message from Emilee Malcolm sent at 11/8/2022  8:59 AM CST -----  I am aware the patient have not seen LILIANA Anne, the patient used to see Dr. Oneil. The patient is scheduled an visit with Omid 1/23/23, he is requesting a refill until then. Message was written in additional notes at the bottom of refill request.

## 2023-01-23 ENCOUNTER — OFFICE VISIT (OUTPATIENT)
Dept: PULMONOLOGY | Facility: CLINIC | Age: 28
End: 2023-01-23
Payer: MEDICAID

## 2023-01-23 VITALS
WEIGHT: 222.75 LBS | DIASTOLIC BLOOD PRESSURE: 84 MMHG | HEIGHT: 69 IN | OXYGEN SATURATION: 98 % | HEART RATE: 93 BPM | SYSTOLIC BLOOD PRESSURE: 126 MMHG | BODY MASS INDEX: 32.99 KG/M2

## 2023-01-23 DIAGNOSIS — G47.10 HYPERSOMNIA WITH SLEEP APNEA: ICD-10-CM

## 2023-01-23 DIAGNOSIS — K21.9 GASTROESOPHAGEAL REFLUX DISEASE, UNSPECIFIED WHETHER ESOPHAGITIS PRESENT: ICD-10-CM

## 2023-01-23 DIAGNOSIS — G47.33 OSA (OBSTRUCTIVE SLEEP APNEA): Primary | ICD-10-CM

## 2023-01-23 DIAGNOSIS — G47.30 HYPERSOMNIA WITH SLEEP APNEA: ICD-10-CM

## 2023-01-23 DIAGNOSIS — R09.81 CHRONIC NASAL CONGESTION: ICD-10-CM

## 2023-01-23 DIAGNOSIS — E66.9 OBESITY (BMI 30.0-34.9): ICD-10-CM

## 2023-01-23 DIAGNOSIS — J45.20 MILD INTERMITTENT ASTHMA, UNSPECIFIED WHETHER COMPLICATED: ICD-10-CM

## 2023-01-23 PROCEDURE — 1159F MED LIST DOCD IN RCRD: CPT | Mod: CPTII,,, | Performed by: NURSE PRACTITIONER

## 2023-01-23 PROCEDURE — 3079F DIAST BP 80-89 MM HG: CPT | Mod: CPTII,,, | Performed by: NURSE PRACTITIONER

## 2023-01-23 PROCEDURE — 99999 PR PBB SHADOW E&M-EST. PATIENT-LVL III: CPT | Mod: PBBFAC,,, | Performed by: NURSE PRACTITIONER

## 2023-01-23 PROCEDURE — 3079F PR MOST RECENT DIASTOLIC BLOOD PRESSURE 80-89 MM HG: ICD-10-PCS | Mod: CPTII,,, | Performed by: NURSE PRACTITIONER

## 2023-01-23 PROCEDURE — 3008F BODY MASS INDEX DOCD: CPT | Mod: CPTII,,, | Performed by: NURSE PRACTITIONER

## 2023-01-23 PROCEDURE — 99213 OFFICE O/P EST LOW 20 MIN: CPT | Mod: S$PBB,,, | Performed by: NURSE PRACTITIONER

## 2023-01-23 PROCEDURE — 99999 PR PBB SHADOW E&M-EST. PATIENT-LVL III: ICD-10-PCS | Mod: PBBFAC,,, | Performed by: NURSE PRACTITIONER

## 2023-01-23 PROCEDURE — 3008F PR BODY MASS INDEX (BMI) DOCUMENTED: ICD-10-PCS | Mod: CPTII,,, | Performed by: NURSE PRACTITIONER

## 2023-01-23 PROCEDURE — 99213 PR OFFICE/OUTPT VISIT, EST, LEVL III, 20-29 MIN: ICD-10-PCS | Mod: S$PBB,,, | Performed by: NURSE PRACTITIONER

## 2023-01-23 PROCEDURE — 3074F PR MOST RECENT SYSTOLIC BLOOD PRESSURE < 130 MM HG: ICD-10-PCS | Mod: CPTII,,, | Performed by: NURSE PRACTITIONER

## 2023-01-23 PROCEDURE — 1159F PR MEDICATION LIST DOCUMENTED IN MEDICAL RECORD: ICD-10-PCS | Mod: CPTII,,, | Performed by: NURSE PRACTITIONER

## 2023-01-23 PROCEDURE — 3074F SYST BP LT 130 MM HG: CPT | Mod: CPTII,,, | Performed by: NURSE PRACTITIONER

## 2023-01-23 PROCEDURE — 99213 OFFICE O/P EST LOW 20 MIN: CPT | Mod: PBBFAC | Performed by: NURSE PRACTITIONER

## 2023-01-23 RX ORDER — ALBUTEROL SULFATE 90 UG/1
2 AEROSOL, METERED RESPIRATORY (INHALATION) EVERY 6 HOURS PRN
Qty: 18 G | Refills: 0 | Status: SHIPPED | OUTPATIENT
Start: 2023-01-23 | End: 2024-04-03 | Stop reason: SDUPTHER

## 2023-01-23 RX ORDER — MODAFINIL 200 MG/1
TABLET ORAL
Qty: 30 TABLET | Refills: 5 | Status: SHIPPED | OUTPATIENT
Start: 2023-01-23

## 2023-01-23 NOTE — PROGRESS NOTES
CHIEF COMPLAINT:    Chief Complaint   Patient presents with    Apnea       HISTORY OF PRESENT ILLNESS: Nghia Chahal is a 27 y.o. male with  has a past medical history of Asthma, Fibromyalgia, Gastritis, Insomnia, and Sleep apnea. is here  for management of obstructive sleep apnea and EDS.  Reports long work hours 5 am-9 pm at [employer] and provigil helps manage daytime sleepiness  HST 2/21/2018 AHI 14  Sleep study PSG/CPAP/MSLT:   IMPRESSION: JAYLEN (G47.33). Effective control of sleep disordered breathing was achieved with CPAP at 14 cm of water.   Severily diminished sleep onset latency of 03:54 minutes was noted on MSLT with 0 SOREMS (sleep onset REM periods). This is suggestive of primary hypersomnolence..     Dreamstation 2  Compliance Summary  5/10/2022 - 8/7/2022 (90 days)  Days with Device Usage 54 days  Days without Device Usage 36 days  Percent Days with Device Usage 60.0%  Cumulative Usage 15 days 1 hrs. 57 mins. 16 secs.  Maximum Usage (1 Day) 9 hrs. 56 mins. 18 secs.  Average Usage (All Days) 4 hrs. 1 mins. 18 secs.  Average Usage (Days Used) 6 hrs. 42 mins. 10 secs.  Minimum Usage (1 Day) 1 hrs. 36 mins. 38 secs.  Percent of Days with Usage >= 4 Hours 57.8%  Percent of Days with Usage < 4 Hours 42.2%  Date Range  Total Blower Time 15 days 1 hrs. 57 mins. 29 secs.  Average AHI 0.6    Weight gain not taking asthma medications   cold cuts + sleep paralysis couple times     REVIEW OF SYSTEMS:   Review of Systems   Constitutional:  Negative for fever, chills, weight loss, weight gain, activity change (physical job , running at work soboe), appetite change, fatigue and night sweats.   HENT:  Positive for postnasal drip and congestion (mold and dust m,ite allergy, no tx , saline not helpful, tried antihistamine, singulair not helpful). Negative for sore throat.    Eyes: Negative.    Respiratory:  Positive for snoring, cough, wheezing and dyspnea on extertion (running). Negative for sputum  "production, chest tightness, orthopnea, previous hospitialization due to pulmonary problems and use of rescue inhaler.         , no exacerbation warranting UC/ER/ no tx   Cardiovascular:  Negative for chest pain and palpitations.   Genitourinary: Negative.    Endocrine: endocrine negative    Musculoskeletal:  Positive for back pain. Negative for gait problem.   Skin: Negative.    Gastrointestinal:  Positive for acid reflux.   Neurological: Negative.    Psychiatric/Behavioral:  Positive for sleep disturbance (couple, paral).        PHYSICAL EXAM:  Vitals:    01/23/23 0948   BP: 126/84   Pulse: 93   SpO2: 98%   Weight: 101.1 kg (222 lb 12.4 oz)   Height: 5' 9" (1.753 m)   PainSc: 0-No pain     Body mass index is 32.9 kg/m².   Physical Exam   Constitutional: He is oriented to person, place, and time. He appears well-developed. No distress.   HENT:   Head: Normocephalic.   Cardiovascular: Normal rate, regular rhythm and normal heart sounds.   Pulmonary/Chest: Normal expansion, effort normal and breath sounds normal.   Musculoskeletal:         General: No edema.      Cervical back: Neck supple.   Neurological: He is alert and oriented to person, place, and time. Gait normal.   Skin: Skin is warm. He is not diaphoretic.   Psychiatric: He has a normal mood and affect. His behavior is normal. Judgment and thought content normal.     ALLERGIES:    Review of patient's allergies indicates:   Allergen Reactions    Penicillins Hives       CURRENT MEDICATIONS:    Current Outpatient Medications   Medication Sig Dispense Refill    naproxen sodium (ANAPROX) 220 MG tablet Take 220 mg by mouth every 12 (twelve) hours.      albuterol (PROVENTIL/VENTOLIN HFA) 90 mcg/actuation inhaler Inhale 2 puffs into the lungs every 6 (six) hours as needed for Wheezing. Rescue 18 g 0    modafiniL (PROVIGIL) 200 MG Tab TAKE 1 TABLET BY MOUTH EVERY MORNING OR EARLY AFTERNOON AS NEEDED FOR SLEEP 30 tablet 5     No current facility-administered " medications for this visit.                  PAST MEDICAL HISTORY:    Active Ambulatory Problems     Diagnosis Date Noted    JAYLEN (obstructive sleep apnea)     Chronic nasal congestion 05/16/2018    Non-seasonal allergic rhinitis 05/16/2018    Nasal turbinate hypertrophy 05/16/2018    Nasal septal deviation 05/16/2018    Hypersomnia with sleep apnea     Severe persistent asthma without complication 02/04/2019    Chronic sinus complaints 02/04/2019    Excessive daytime sleepiness 02/04/2023    Mild intermittent asthma 02/04/2023    Obesity (BMI 30.0-34.9) 02/04/2023    Gastroesophageal reflux disease 02/04/2023     Resolved Ambulatory Problems     Diagnosis Date Noted    No Resolved Ambulatory Problems     Past Medical History:   Diagnosis Date    Asthma     Fibromyalgia     Gastritis     Insomnia     Sleep apnea                 PAST SURGICAL HISTORY:    History reviewed. No pertinent surgical history.      FAMILY HISTORY:                Family History   Problem Relation Age of Onset    Sleep apnea Mother     Retinal detachment Neg Hx     Macular degeneration Neg Hx     Glaucoma Neg Hx        SOCIAL HISTORY:          Tobacco:   Social History     Tobacco Use   Smoking Status Never   Smokeless Tobacco Never       alcohol use:    Social History     Substance and Sexual Activity   Alcohol Use No                   LABS:   TSH:  No results found for: TSH  CBC:  Lab Results   Component Value Date    WBC 6.30 02/07/2019    HGB 15.4 02/07/2019    HCT 45.3 02/07/2019    MCV 96 02/07/2019     02/07/2019     BMP:  No results found for: NA, K, CL, CO2, BUN, CREATININE, CALCIUM, ANIONGAP, ESTGFRAFRICA, EGFRNONAA  HgbA1C:  No results found for: LABA1C, HGBA1C       ASSESSMENT    ICD-10-CM ICD-9-CM    1. JAYLEN (obstructive sleep apnea)  G47.33 327.23       2. Chronic nasal congestion  R09.81 478.19       3. Gastroesophageal reflux disease, unspecified whether esophagitis present  K21.9 530.81       4. Obesity (BMI 30.0-34.9)   E66.9 278.00       5. Hypersomnia with sleep apnea  G47.10 780.53 modafiniL (PROVIGIL) 200 MG Tab    G47.30        6. Mild intermittent asthma, unspecified whether complicated  J45.20 493.90 albuterol (PROVENTIL/VENTOLIN HFA) 90 mcg/actuation inhaler          PLAN:    Problem List Items Addressed This Visit          Unprioritized    Chronic nasal congestion    Overview     Pt follow  at [facility]         Gastroesophageal reflux disease    Overview      on weight loss and diet modification         Hypersomnia with sleep apnea    Overview     The current tx provigil is effective, continue treatment         Relevant Medications    modafiniL (PROVIGIL) 200 MG Tab    Mild intermittent asthma    Overview      on managing asthma, pt without history exacerbation but with symptoms at work, encourage tx with albuterol prn, consider maintenance tx if symptoms frequent and persistent, pt follow by pulmonologist at [facility],  on managing triggers AR and GERD         Relevant Medications    albuterol (PROVENTIL/VENTOLIN HFA) 90 mcg/actuation inhaler    Obesity (BMI 30.0-34.9)    Overview     Patient is aware of need for weight loss  and is making an effort to improve diet (eating more fruits and vegetables and reducing simple sugars and fried foods) and exercise regularly         JAYLEN (obstructive sleep apnea) - Primary    Overview     HST 2/21/2018 AHI 14  Sleep study PSG/CPAP/MSLT:   IMPRESSION: JAYLEN (G47.33). Effective control of sleep disordered breathing was achieved with CPAP at 14 cm of water.   Severily diminished sleep onset latency of 03:54 minutes was noted on MSLT with 0 SOREMS (sleep onset REM periods). This is suggestive of primary hypersomnolence..   Patient is using and benefiting from cpap. Residual predicted AHI optimal.              Patient will Follow up in about 6 months (around 7/23/2023), or if symptoms worsen or fail to improve.

## 2023-01-23 NOTE — PROGRESS NOTES
Subjective:       Patient ID: Nghia Chahal is a 27 y.o. male.    Chief Complaint: Apnea    HPI  Review of Systems   Constitutional:  Negative for chills and fever.   HENT:  Positive for nasal congestion, postnasal drip (chronic constant for years), rhinorrhea and sinus pressure/congestion. Negative for nosebleeds, sneezing, sore throat and trouble swallowing.    Respiratory:  Positive for apnea (spells of coughing), cough (chronic, upon waking coughing), choking (in the morning,vibration feeling) and wheezing. Negative for chest tightness, shortness of breath and stridor.    Gastrointestinal:  Positive for abdominal pain, nausea, vomiting (2 days ago, 2 times,  associated with acid reflux) and reflux. Negative for constipation and diarrhea.   Genitourinary:  Negative for difficulty urinating.   Neurological:  Negative for seizures, syncope, light-headedness and headaches.   Psychiatric/Behavioral:  Positive for sleep disturbance (wake up feeling heavy can't move and goes back to sleep in less than 5 mins). Negative for agitation, confusion and hallucinations. The patient is not nervous/anxious and is not hyperactive.        Objective:      Physical Exam  Constitutional:       Appearance: Normal appearance.   Cardiovascular:      Pulses: Normal pulses.      Heart sounds: Normal heart sounds.   Pulmonary:      Breath sounds: Normal breath sounds.   Skin:     General: Skin is warm.   Neurological:      Mental Status: He is alert.       Assessment:       Problem List Items Addressed This Visit    None      Plan:

## 2023-02-04 PROBLEM — K21.9 GASTROESOPHAGEAL REFLUX DISEASE: Status: ACTIVE | Noted: 2023-02-04

## 2023-02-04 PROBLEM — E66.9 OBESITY (BMI 30.0-34.9): Status: ACTIVE | Noted: 2023-02-04

## 2023-02-04 PROBLEM — G47.19 EXCESSIVE DAYTIME SLEEPINESS: Status: ACTIVE | Noted: 2023-02-04

## 2023-02-04 PROBLEM — J45.20 MILD INTERMITTENT ASTHMA: Status: ACTIVE | Noted: 2023-02-04

## 2024-08-06 ENCOUNTER — HOSPITAL ENCOUNTER (EMERGENCY)
Facility: HOSPITAL | Age: 29
Discharge: HOME | End: 2024-08-06
Attending: EMERGENCY MEDICINE
Payer: COMMERCIAL

## 2024-08-06 VITALS
WEIGHT: 220 LBS | OXYGEN SATURATION: 98 % | SYSTOLIC BLOOD PRESSURE: 118 MMHG | HEART RATE: 84 BPM | BODY MASS INDEX: 32.58 KG/M2 | HEIGHT: 69 IN | TEMPERATURE: 98 F | DIASTOLIC BLOOD PRESSURE: 83 MMHG | RESPIRATION RATE: 20 BRPM

## 2024-08-06 DIAGNOSIS — G47.30 SLEEP APNEA, UNSPECIFIED TYPE: Primary | ICD-10-CM

## 2024-08-06 PROCEDURE — 99281 EMR DPT VST MAYX REQ PHY/QHP: CPT
